# Patient Record
Sex: MALE | Race: BLACK OR AFRICAN AMERICAN | URBAN - METROPOLITAN AREA
[De-identification: names, ages, dates, MRNs, and addresses within clinical notes are randomized per-mention and may not be internally consistent; named-entity substitution may affect disease eponyms.]

---

## 2020-11-30 ENCOUNTER — RECORDS - HEALTHEAST (OUTPATIENT)
Dept: SCHEDULING | Facility: CLINIC | Age: 60
End: 2020-11-30

## 2020-11-30 ENCOUNTER — RECORDS - HEALTHEAST (OUTPATIENT)
Dept: ADMINISTRATIVE | Facility: OTHER | Age: 60
End: 2020-11-30

## 2020-11-30 DIAGNOSIS — R76.12 POSITIVE QUANTIFERON-TB GOLD TEST: ICD-10-CM

## 2025-07-17 ENCOUNTER — APPOINTMENT (OUTPATIENT)
Dept: CT IMAGING | Facility: HOSPITAL | Age: 65
End: 2025-07-17
Attending: STUDENT IN AN ORGANIZED HEALTH CARE EDUCATION/TRAINING PROGRAM
Payer: COMMERCIAL

## 2025-07-17 ENCOUNTER — HOSPITAL ENCOUNTER (INPATIENT)
Facility: HOSPITAL | Age: 65
End: 2025-07-17
Attending: STUDENT IN AN ORGANIZED HEALTH CARE EDUCATION/TRAINING PROGRAM | Admitting: INTERNAL MEDICINE
Payer: COMMERCIAL

## 2025-07-17 VITALS
RESPIRATION RATE: 18 BRPM | BODY MASS INDEX: 33.41 KG/M2 | HEART RATE: 78 BPM | TEMPERATURE: 98.9 F | HEIGHT: 69 IN | OXYGEN SATURATION: 96 % | DIASTOLIC BLOOD PRESSURE: 74 MMHG | SYSTOLIC BLOOD PRESSURE: 154 MMHG | WEIGHT: 225.6 LBS

## 2025-07-17 DIAGNOSIS — K92.1 BLACK STOOLS: ICD-10-CM

## 2025-07-17 DIAGNOSIS — K92.2 UPPER GI BLEED: ICD-10-CM

## 2025-07-17 LAB
ABO + RH BLD: NORMAL
ALBUMIN SERPL BCG-MCNC: 4.3 G/DL (ref 3.5–5.2)
ALP SERPL-CCNC: 99 U/L (ref 40–150)
ALT SERPL W P-5'-P-CCNC: 24 U/L (ref 0–70)
ANION GAP SERPL CALCULATED.3IONS-SCNC: 11 MMOL/L (ref 7–15)
AST SERPL W P-5'-P-CCNC: 21 U/L (ref 0–45)
BASOPHILS # BLD AUTO: 0 10E3/UL (ref 0–0.2)
BASOPHILS NFR BLD AUTO: 0 %
BILIRUB SERPL-MCNC: 0.5 MG/DL
BLD GP AB SCN SERPL QL: NEGATIVE
BUN SERPL-MCNC: 22.6 MG/DL (ref 8–23)
CALCIUM SERPL-MCNC: 8.9 MG/DL (ref 8.8–10.4)
CHLORIDE SERPL-SCNC: 110 MMOL/L (ref 98–107)
CREAT SERPL-MCNC: 0.84 MG/DL (ref 0.67–1.17)
EGFRCR SERPLBLD CKD-EPI 2021: >90 ML/MIN/1.73M2
EOSINOPHIL # BLD AUTO: 0.2 10E3/UL (ref 0–0.7)
EOSINOPHIL NFR BLD AUTO: 2 %
ERYTHROCYTE [DISTWIDTH] IN BLOOD BY AUTOMATED COUNT: 13.1 % (ref 10–15)
GLUCOSE SERPL-MCNC: 111 MG/DL (ref 70–99)
HCO3 SERPL-SCNC: 23 MMOL/L (ref 22–29)
HCT VFR BLD AUTO: 43.5 % (ref 40–53)
HGB BLD-MCNC: 14.4 G/DL (ref 13.3–17.7)
HOLD SPECIMEN: NORMAL
IMM GRANULOCYTES # BLD: 0 10E3/UL
IMM GRANULOCYTES NFR BLD: 0 %
INR PPP: 0.99 (ref 0.85–1.15)
LYMPHOCYTES # BLD AUTO: 3.5 10E3/UL (ref 0.8–5.3)
LYMPHOCYTES NFR BLD AUTO: 41 %
MCH RBC QN AUTO: 29.7 PG (ref 26.5–33)
MCHC RBC AUTO-ENTMCNC: 33.1 G/DL (ref 31.5–36.5)
MCV RBC AUTO: 90 FL (ref 78–100)
MONOCYTES # BLD AUTO: 0.5 10E3/UL (ref 0–1.3)
MONOCYTES NFR BLD AUTO: 6 %
NEUTROPHILS # BLD AUTO: 4.3 10E3/UL (ref 1.6–8.3)
NEUTROPHILS NFR BLD AUTO: 51 %
NRBC # BLD AUTO: 0 10E3/UL
NRBC BLD AUTO-RTO: 0 /100
PLATELET # BLD AUTO: 199 10E3/UL (ref 150–450)
POTASSIUM SERPL-SCNC: 4.2 MMOL/L (ref 3.4–5.3)
PROT SERPL-MCNC: 7.1 G/DL (ref 6.4–8.3)
PROTHROMBIN TIME: 13.4 SECONDS (ref 11.8–14.8)
RBC # BLD AUTO: 4.85 10E6/UL (ref 4.4–5.9)
SODIUM SERPL-SCNC: 144 MMOL/L (ref 135–145)
SPECIMEN EXP DATE BLD: NORMAL
WBC # BLD AUTO: 8.5 10E3/UL (ref 4–11)

## 2025-07-17 PROCEDURE — 96374 THER/PROPH/DIAG INJ IV PUSH: CPT | Mod: 59

## 2025-07-17 PROCEDURE — 250N000011 HC RX IP 250 OP 636: Performed by: STUDENT IN AN ORGANIZED HEALTH CARE EDUCATION/TRAINING PROGRAM

## 2025-07-17 PROCEDURE — 85610 PROTHROMBIN TIME: CPT | Performed by: STUDENT IN AN ORGANIZED HEALTH CARE EDUCATION/TRAINING PROGRAM

## 2025-07-17 PROCEDURE — 96361 HYDRATE IV INFUSION ADD-ON: CPT

## 2025-07-17 PROCEDURE — 258N000003 HC RX IP 258 OP 636: Performed by: STUDENT IN AN ORGANIZED HEALTH CARE EDUCATION/TRAINING PROGRAM

## 2025-07-17 PROCEDURE — 85014 HEMATOCRIT: CPT | Performed by: STUDENT IN AN ORGANIZED HEALTH CARE EDUCATION/TRAINING PROGRAM

## 2025-07-17 PROCEDURE — 74174 CTA ABD&PLVS W/CONTRAST: CPT

## 2025-07-17 PROCEDURE — 120N000001 HC R&B MED SURG/OB

## 2025-07-17 PROCEDURE — 99223 1ST HOSP IP/OBS HIGH 75: CPT | Performed by: INTERNAL MEDICINE

## 2025-07-17 PROCEDURE — 82310 ASSAY OF CALCIUM: CPT | Performed by: STUDENT IN AN ORGANIZED HEALTH CARE EDUCATION/TRAINING PROGRAM

## 2025-07-17 PROCEDURE — 86900 BLOOD TYPING SEROLOGIC ABO: CPT | Performed by: STUDENT IN AN ORGANIZED HEALTH CARE EDUCATION/TRAINING PROGRAM

## 2025-07-17 PROCEDURE — 36415 COLL VENOUS BLD VENIPUNCTURE: CPT | Performed by: STUDENT IN AN ORGANIZED HEALTH CARE EDUCATION/TRAINING PROGRAM

## 2025-07-17 PROCEDURE — 99285 EMERGENCY DEPT VISIT HI MDM: CPT | Mod: 25

## 2025-07-17 RX ORDER — DIAZEPAM 10 MG/2ML
5 INJECTION, SOLUTION INTRAMUSCULAR; INTRAVENOUS ONCE
Status: COMPLETED | OUTPATIENT
Start: 2025-07-18 | End: 2025-07-17

## 2025-07-17 RX ORDER — IOPAMIDOL 755 MG/ML
90 INJECTION, SOLUTION INTRAVASCULAR ONCE
Status: COMPLETED | OUTPATIENT
Start: 2025-07-17 | End: 2025-07-17

## 2025-07-17 RX ORDER — ESCITALOPRAM OXALATE 10 MG/1
10 TABLET ORAL ONCE
Status: DISCONTINUED | OUTPATIENT
Start: 2025-07-18 | End: 2025-07-17

## 2025-07-17 RX ADMIN — SODIUM CHLORIDE 1000 ML: 0.9 INJECTION, SOLUTION INTRAVENOUS at 21:23

## 2025-07-17 RX ADMIN — DIAZEPAM 5 MG: 5 INJECTION, SOLUTION INTRAMUSCULAR; INTRAVENOUS at 23:58

## 2025-07-17 RX ADMIN — IOPAMIDOL 90 ML: 755 INJECTION, SOLUTION INTRAVENOUS at 22:13

## 2025-07-17 RX ADMIN — PANTOPRAZOLE SODIUM 40 MG: 40 INJECTION, POWDER, FOR SOLUTION INTRAVENOUS at 21:22

## 2025-07-17 ASSESSMENT — COLUMBIA-SUICIDE SEVERITY RATING SCALE - C-SSRS
2. HAVE YOU ACTUALLY HAD ANY THOUGHTS OF KILLING YOURSELF IN THE PAST MONTH?: NO
6. HAVE YOU EVER DONE ANYTHING, STARTED TO DO ANYTHING, OR PREPARED TO DO ANYTHING TO END YOUR LIFE?: NO
1. IN THE PAST MONTH, HAVE YOU WISHED YOU WERE DEAD OR WISHED YOU COULD GO TO SLEEP AND NOT WAKE UP?: NO

## 2025-07-17 ASSESSMENT — ACTIVITIES OF DAILY LIVING (ADL)
ADLS_ACUITY_SCORE: 43
ADLS_ACUITY_SCORE: 41
ADLS_ACUITY_SCORE: 43

## 2025-07-17 NOTE — LETTER
[facility] EXTENDED RECOVERY AND SHORT STAY  [address] [address]  Phone: [phone]  Fax: [phone]    July 19, 2025        Angel Garcia  [address] [address]          To whom it may concern:    RE: Angel Garcia was hospitalized and under our care from 7/17/25-7/19/25 and may return to work on 7/23/25 without restrictions.    Mr. Garcia has a scheduled work trip on 7/28/25, and I have recommended that he make a decision based on his ability to attend this trip early to mid week based on how he is clinically feeling at that time.  Thank you very much for your understanding on this request.     Please contact me for questions or concerns.      Sincerely,      ElseBeau MD

## 2025-07-18 PROBLEM — K50.90 CROHN'S DISEASE (H): Status: ACTIVE | Noted: 2025-07-18

## 2025-07-18 PROBLEM — R76.12 POSITIVE QUANTIFERON-TB GOLD TEST: Status: ACTIVE | Noted: 2022-08-16

## 2025-07-18 PROBLEM — I10 ESSENTIAL HYPERTENSION: Status: ACTIVE | Noted: 2023-03-06

## 2025-07-18 PROBLEM — L40.9 PSORIASIS: Status: ACTIVE | Noted: 2021-09-03

## 2025-07-18 PROBLEM — Z92.89 HISTORY OF RECENT BLOOD TRANSFUSION: Status: ACTIVE | Noted: 2025-07-18

## 2025-07-18 PROBLEM — K92.2 UPPER GI BLEED: Status: ACTIVE | Noted: 2025-07-18

## 2025-07-18 PROBLEM — K31.7 BENIGN POLYP OF DUODENUM: Status: ACTIVE | Noted: 2025-07-18

## 2025-07-18 PROBLEM — R14.0 ABDOMINAL DISTENSION, GASEOUS: Status: ACTIVE | Noted: 2019-04-16

## 2025-07-18 PROBLEM — D62 ANEMIA DUE TO BLOOD LOSS, ACUTE: Status: ACTIVE | Noted: 2025-07-18

## 2025-07-18 PROBLEM — R14.0 ABDOMINAL BLOATING: Status: ACTIVE | Noted: 2022-08-16

## 2025-07-18 LAB
ALBUMIN SERPL BCG-MCNC: 3.8 G/DL (ref 3.5–5.2)
ALP SERPL-CCNC: 72 U/L (ref 40–150)
ALT SERPL W P-5'-P-CCNC: 21 U/L (ref 0–70)
ANION GAP SERPL CALCULATED.3IONS-SCNC: 7 MMOL/L (ref 7–15)
AST SERPL W P-5'-P-CCNC: 17 U/L (ref 0–45)
BASOPHILS # BLD AUTO: 0 10E3/UL (ref 0–0.2)
BASOPHILS NFR BLD AUTO: 0 %
BILIRUB SERPL-MCNC: 0.5 MG/DL
BUN SERPL-MCNC: 31.5 MG/DL (ref 8–23)
CALCIUM SERPL-MCNC: 8.5 MG/DL (ref 8.8–10.4)
CHLORIDE SERPL-SCNC: 112 MMOL/L (ref 98–107)
CREAT SERPL-MCNC: 0.81 MG/DL (ref 0.67–1.17)
EGFRCR SERPLBLD CKD-EPI 2021: >90 ML/MIN/1.73M2
EOSINOPHIL # BLD AUTO: 0.1 10E3/UL (ref 0–0.7)
EOSINOPHIL NFR BLD AUTO: 1 %
ERYTHROCYTE [DISTWIDTH] IN BLOOD BY AUTOMATED COUNT: 13.2 % (ref 10–15)
GLUCOSE BLDC GLUCOMTR-MCNC: 115 MG/DL (ref 70–99)
GLUCOSE BLDC GLUCOMTR-MCNC: 115 MG/DL (ref 70–99)
GLUCOSE SERPL-MCNC: 87 MG/DL (ref 70–99)
HCO3 SERPL-SCNC: 25 MMOL/L (ref 22–29)
HCT VFR BLD AUTO: 37.4 % (ref 40–53)
HGB BLD-MCNC: 12.4 G/DL (ref 13.3–17.7)
HGB BLD-MCNC: 12.9 G/DL (ref 13.3–17.7)
HGB BLD-MCNC: 13.3 G/DL (ref 13.3–17.7)
HGB BLD-MCNC: 13.3 G/DL (ref 13.3–17.7)
IMM GRANULOCYTES # BLD: 0 10E3/UL
IMM GRANULOCYTES NFR BLD: 1 %
LYMPHOCYTES # BLD AUTO: 2.3 10E3/UL (ref 0.8–5.3)
LYMPHOCYTES NFR BLD AUTO: 27 %
MCH RBC QN AUTO: 31.3 PG (ref 26.5–33)
MCHC RBC AUTO-ENTMCNC: 34.5 G/DL (ref 31.5–36.5)
MCV RBC AUTO: 91 FL (ref 78–100)
MCV RBC AUTO: 91 FL (ref 78–100)
MCV RBC AUTO: 92 FL (ref 78–100)
MCV RBC AUTO: 92 FL (ref 78–100)
MONOCYTES # BLD AUTO: 0.6 10E3/UL (ref 0–1.3)
MONOCYTES NFR BLD AUTO: 6 %
NEUTROPHILS # BLD AUTO: 5.6 10E3/UL (ref 1.6–8.3)
NEUTROPHILS NFR BLD AUTO: 66 %
NRBC # BLD AUTO: 0 10E3/UL
NRBC BLD AUTO-RTO: 0 /100
PLATELET # BLD AUTO: 183 10E3/UL (ref 150–450)
POTASSIUM SERPL-SCNC: 4.8 MMOL/L (ref 3.4–5.3)
PROT SERPL-MCNC: 6.2 G/DL (ref 6.4–8.3)
RBC # BLD AUTO: 4.12 10E6/UL (ref 4.4–5.9)
SODIUM SERPL-SCNC: 144 MMOL/L (ref 135–145)
UPPER GI ENDOSCOPY: NORMAL
WBC # BLD AUTO: 8.5 10E3/UL (ref 4–11)

## 2025-07-18 PROCEDURE — 370N000017 HC ANESTHESIA TECHNICAL FEE, PER MIN: Performed by: INTERNAL MEDICINE

## 2025-07-18 PROCEDURE — 250N000011 HC RX IP 250 OP 636: Performed by: INTERNAL MEDICINE

## 2025-07-18 PROCEDURE — 258N000003 HC RX IP 258 OP 636: Performed by: INTERNAL MEDICINE

## 2025-07-18 PROCEDURE — 250N000013 HC RX MED GY IP 250 OP 250 PS 637: Performed by: HOSPITALIST

## 2025-07-18 PROCEDURE — 0W3P8ZZ CONTROL BLEEDING IN GASTROINTESTINAL TRACT, VIA NATURAL OR ARTIFICIAL OPENING ENDOSCOPIC: ICD-10-PCS | Performed by: INTERNAL MEDICINE

## 2025-07-18 PROCEDURE — 272N000001 HC OR GENERAL SUPPLY STERILE: Performed by: INTERNAL MEDICINE

## 2025-07-18 PROCEDURE — 120N000001 HC R&B MED SURG/OB

## 2025-07-18 PROCEDURE — 85025 COMPLETE CBC W/AUTO DIFF WBC: CPT | Performed by: INTERNAL MEDICINE

## 2025-07-18 PROCEDURE — 360N000075 HC SURGERY LEVEL 2, PER MIN: Performed by: INTERNAL MEDICINE

## 2025-07-18 PROCEDURE — 710N000012 HC RECOVERY PHASE 2, PER MINUTE: Performed by: INTERNAL MEDICINE

## 2025-07-18 PROCEDURE — 258N000003 HC RX IP 258 OP 636: Performed by: ANESTHESIOLOGY

## 2025-07-18 PROCEDURE — 999N000141 HC STATISTIC PRE-PROCEDURE NURSING ASSESSMENT: Performed by: INTERNAL MEDICINE

## 2025-07-18 PROCEDURE — 36415 COLL VENOUS BLD VENIPUNCTURE: CPT | Performed by: INTERNAL MEDICINE

## 2025-07-18 PROCEDURE — 80053 COMPREHEN METABOLIC PANEL: CPT | Performed by: INTERNAL MEDICINE

## 2025-07-18 PROCEDURE — 250N000013 HC RX MED GY IP 250 OP 250 PS 637: Performed by: STUDENT IN AN ORGANIZED HEALTH CARE EDUCATION/TRAINING PROGRAM

## 2025-07-18 PROCEDURE — 85004 AUTOMATED DIFF WBC COUNT: CPT | Performed by: INTERNAL MEDICINE

## 2025-07-18 PROCEDURE — 99233 SBSQ HOSP IP/OBS HIGH 50: CPT | Performed by: HOSPITALIST

## 2025-07-18 DEVICE — CLIP HEMOSTATIC ASSURANCE W11 MM 00711882: Type: IMPLANTABLE DEVICE | Site: DUODENUM | Status: FUNCTIONAL

## 2025-07-18 DEVICE — CLIP HEMOSTASIS ASSURANCE W16 MM BX00711884: Type: IMPLANTABLE DEVICE | Site: DUODENUM | Status: FUNCTIONAL

## 2025-07-18 RX ORDER — USTEKINUMAB 90 MG/ML
1 INJECTION, SOLUTION SUBCUTANEOUS
COMMUNITY
Start: 2025-06-26

## 2025-07-18 RX ORDER — HYDROMORPHONE HCL IN WATER/PF 6 MG/30 ML
0.2 PATIENT CONTROLLED ANALGESIA SYRINGE INTRAVENOUS EVERY 5 MIN PRN
Status: DISCONTINUED | OUTPATIENT
Start: 2025-07-18 | End: 2025-07-18 | Stop reason: HOSPADM

## 2025-07-18 RX ORDER — CETIRIZINE HYDROCHLORIDE 10 MG/1
10 TABLET ORAL EVERY MORNING
COMMUNITY

## 2025-07-18 RX ORDER — LIDOCAINE 40 MG/G
CREAM TOPICAL
Status: DISCONTINUED | OUTPATIENT
Start: 2025-07-18 | End: 2025-07-18 | Stop reason: HOSPADM

## 2025-07-18 RX ORDER — SODIUM CHLORIDE 9 MG/ML
INJECTION, SOLUTION INTRAVENOUS CONTINUOUS
Status: DISCONTINUED | OUTPATIENT
Start: 2025-07-18 | End: 2025-07-18

## 2025-07-18 RX ORDER — SODIUM CHLORIDE, SODIUM LACTATE, POTASSIUM CHLORIDE, CALCIUM CHLORIDE 600; 310; 30; 20 MG/100ML; MG/100ML; MG/100ML; MG/100ML
INJECTION, SOLUTION INTRAVENOUS CONTINUOUS
Status: DISCONTINUED | OUTPATIENT
Start: 2025-07-18 | End: 2025-07-18 | Stop reason: HOSPADM

## 2025-07-18 RX ORDER — NALOXONE HYDROCHLORIDE 0.4 MG/ML
0.1 INJECTION, SOLUTION INTRAMUSCULAR; INTRAVENOUS; SUBCUTANEOUS
Status: DISCONTINUED | OUTPATIENT
Start: 2025-07-18 | End: 2025-07-18 | Stop reason: HOSPADM

## 2025-07-18 RX ORDER — FENTANYL CITRATE 50 UG/ML
50 INJECTION, SOLUTION INTRAMUSCULAR; INTRAVENOUS EVERY 5 MIN PRN
Status: DISCONTINUED | OUTPATIENT
Start: 2025-07-18 | End: 2025-07-18 | Stop reason: HOSPADM

## 2025-07-18 RX ORDER — HYDROMORPHONE HCL IN WATER/PF 6 MG/30 ML
0.4 PATIENT CONTROLLED ANALGESIA SYRINGE INTRAVENOUS EVERY 5 MIN PRN
Status: DISCONTINUED | OUTPATIENT
Start: 2025-07-18 | End: 2025-07-18 | Stop reason: HOSPADM

## 2025-07-18 RX ORDER — MULTIVITAMIN WITH IRON
1 TABLET ORAL DAILY
COMMUNITY

## 2025-07-18 RX ORDER — DEXTROSE MONOHYDRATE 25 G/50ML
25-50 INJECTION, SOLUTION INTRAVENOUS
Status: DISCONTINUED | OUTPATIENT
Start: 2025-07-18 | End: 2025-07-19 | Stop reason: HOSPADM

## 2025-07-18 RX ORDER — ONDANSETRON 2 MG/ML
4 INJECTION INTRAMUSCULAR; INTRAVENOUS EVERY 30 MIN PRN
Status: DISCONTINUED | OUTPATIENT
Start: 2025-07-18 | End: 2025-07-18 | Stop reason: HOSPADM

## 2025-07-18 RX ORDER — ACETAMINOPHEN AND CODEINE PHOSPHATE 300; 30 MG/1; MG/1
1 TABLET ORAL EVERY 4 HOURS PRN
COMMUNITY
Start: 2025-07-13

## 2025-07-18 RX ORDER — DIAZEPAM 10 MG/2ML
2.5 INJECTION, SOLUTION INTRAMUSCULAR; INTRAVENOUS ONCE
Status: COMPLETED | OUTPATIENT
Start: 2025-07-18 | End: 2025-07-18

## 2025-07-18 RX ORDER — LEVALBUTEROL TARTRATE 45 UG/1
1-2 AEROSOL, METERED ORAL EVERY 4 HOURS PRN
COMMUNITY
Start: 2025-07-02

## 2025-07-18 RX ORDER — ONDANSETRON 4 MG/1
4 TABLET, ORALLY DISINTEGRATING ORAL EVERY 30 MIN PRN
Status: DISCONTINUED | OUTPATIENT
Start: 2025-07-18 | End: 2025-07-18 | Stop reason: HOSPADM

## 2025-07-18 RX ORDER — EPINEPHRINE 0.1 MG/ML
INJECTION INTRAVENOUS PRN
Status: DISCONTINUED | OUTPATIENT
Start: 2025-07-18 | End: 2025-07-18 | Stop reason: HOSPADM

## 2025-07-18 RX ORDER — ALPRAZOLAM 0.5 MG
0.5 TABLET ORAL 2 TIMES DAILY PRN
COMMUNITY
Start: 2024-07-22

## 2025-07-18 RX ORDER — DEXAMETHASONE SODIUM PHOSPHATE 10 MG/ML
4 INJECTION, SOLUTION INTRAMUSCULAR; INTRAVENOUS
Status: DISCONTINUED | OUTPATIENT
Start: 2025-07-18 | End: 2025-07-18 | Stop reason: HOSPADM

## 2025-07-18 RX ORDER — ALUMINUM ZIRCONIUM OCTACHLOROHYDREX GLY 16 G/100G
2 GEL TOPICAL EVERY 24 HOURS
COMMUNITY
Start: 2025-05-29

## 2025-07-18 RX ORDER — FAMOTIDINE 10 MG
10 TABLET ORAL 2 TIMES DAILY PRN
Status: DISCONTINUED | OUTPATIENT
Start: 2025-07-18 | End: 2025-07-19 | Stop reason: HOSPADM

## 2025-07-18 RX ORDER — OMEPRAZOLE 20 MG/1
20 CAPSULE, DELAYED RELEASE ORAL 2 TIMES DAILY
COMMUNITY
Start: 2025-05-07

## 2025-07-18 RX ORDER — USTEKINUMAB 90 MG/ML
90 INJECTION, SOLUTION SUBCUTANEOUS
COMMUNITY
Start: 2024-11-05

## 2025-07-18 RX ORDER — ACETAMINOPHEN 325 MG/1
650 TABLET ORAL EVERY 6 HOURS PRN
Status: DISCONTINUED | OUTPATIENT
Start: 2025-07-18 | End: 2025-07-19 | Stop reason: HOSPADM

## 2025-07-18 RX ORDER — NICOTINE POLACRILEX 4 MG
15-30 LOZENGE BUCCAL
Status: DISCONTINUED | OUTPATIENT
Start: 2025-07-18 | End: 2025-07-19 | Stop reason: HOSPADM

## 2025-07-18 RX ORDER — ATENOLOL 25 MG/1
25 TABLET ORAL 2 TIMES DAILY
COMMUNITY
Start: 2024-02-29

## 2025-07-18 RX ORDER — FAMOTIDINE 20 MG/1
20 TABLET, FILM COATED ORAL 2 TIMES DAILY
COMMUNITY
Start: 2025-03-05

## 2025-07-18 RX ORDER — CETIRIZINE HYDROCHLORIDE 10 MG/1
2 TABLET ORAL EVERY EVENING
COMMUNITY
Start: 2025-07-02

## 2025-07-18 RX ORDER — OXYCODONE HYDROCHLORIDE 5 MG/1
10 TABLET ORAL
Status: DISCONTINUED | OUTPATIENT
Start: 2025-07-18 | End: 2025-07-18

## 2025-07-18 RX ORDER — LIDOCAINE 40 MG/G
CREAM TOPICAL
Status: DISCONTINUED | OUTPATIENT
Start: 2025-07-18 | End: 2025-07-19 | Stop reason: HOSPADM

## 2025-07-18 RX ORDER — CALCIUM CARBONATE 500 MG/1
1000 TABLET, CHEWABLE ORAL 4 TIMES DAILY PRN
Status: DISCONTINUED | OUTPATIENT
Start: 2025-07-18 | End: 2025-07-19 | Stop reason: HOSPADM

## 2025-07-18 RX ORDER — ESCITALOPRAM OXALATE 10 MG/1
10 TABLET ORAL DAILY
COMMUNITY
Start: 2024-07-22

## 2025-07-18 RX ORDER — FENTANYL CITRATE 50 UG/ML
25 INJECTION, SOLUTION INTRAMUSCULAR; INTRAVENOUS EVERY 5 MIN PRN
Status: DISCONTINUED | OUTPATIENT
Start: 2025-07-18 | End: 2025-07-18 | Stop reason: HOSPADM

## 2025-07-18 RX ORDER — OXYCODONE HYDROCHLORIDE 5 MG/1
5 TABLET ORAL
Status: DISCONTINUED | OUTPATIENT
Start: 2025-07-18 | End: 2025-07-18

## 2025-07-18 RX ADMIN — SODIUM CHLORIDE, SODIUM LACTATE, POTASSIUM CHLORIDE, AND CALCIUM CHLORIDE: .6; .31; .03; .02 INJECTION, SOLUTION INTRAVENOUS at 11:13

## 2025-07-18 RX ADMIN — SODIUM CHLORIDE: 0.9 INJECTION, SOLUTION INTRAVENOUS at 01:58

## 2025-07-18 RX ADMIN — PANTOPRAZOLE SODIUM 40 MG: 40 INJECTION, POWDER, FOR SOLUTION INTRAVENOUS at 19:44

## 2025-07-18 RX ADMIN — DIAZEPAM 2.5 MG: 10 INJECTION, SOLUTION INTRAMUSCULAR; INTRAVENOUS at 01:59

## 2025-07-18 RX ADMIN — ACETAMINOPHEN 650 MG: 325 TABLET ORAL at 19:44

## 2025-07-18 RX ADMIN — FAMOTIDINE 10 MG: 10 TABLET ORAL at 14:20

## 2025-07-18 RX ADMIN — PANTOPRAZOLE SODIUM 40 MG: 40 INJECTION, POWDER, FOR SOLUTION INTRAVENOUS at 10:02

## 2025-07-18 ASSESSMENT — ACTIVITIES OF DAILY LIVING (ADL)
ADLS_ACUITY_SCORE: 36
ADLS_ACUITY_SCORE: 43
ADLS_ACUITY_SCORE: 56
ADLS_ACUITY_SCORE: 56
ADLS_ACUITY_SCORE: 43
ADLS_ACUITY_SCORE: 43
ADLS_ACUITY_SCORE: 56
ADLS_ACUITY_SCORE: 56
ADLS_ACUITY_SCORE: 43
ADLS_ACUITY_SCORE: 56
ADLS_ACUITY_SCORE: 43
ADLS_ACUITY_SCORE: 56
ADLS_ACUITY_SCORE: 56
ADLS_ACUITY_SCORE: 36
ADLS_ACUITY_SCORE: 43
ADLS_ACUITY_SCORE: 36
ADLS_ACUITY_SCORE: 43
ADLS_ACUITY_SCORE: 33
ADLS_ACUITY_SCORE: 56
ADLS_ACUITY_SCORE: 56
ADLS_ACUITY_SCORE: 43
ADLS_ACUITY_SCORE: 43
ADLS_ACUITY_SCORE: 56

## 2025-07-18 NOTE — H&P
[facility] Digestive Health consult         Name: Angel Garcia    Medical Record #: [id number]    YOB: 1960    Date/Time: 7/18/2025/8:50 AM    Reason for Consultation: Kiki De La Rosa MD has asked me to evaluate Angel Garcia regarding GI bleeding.    HPI: 65 years old with history of Crohn's disease eosinophilic esophagitis, previous peptic ulcer disease chronic back pain, depression on SSRI GERD, latent TB.  He presents with black tarry stool.  He started having melena yesterday, and then had reddish blood and black stool overnight.  Felt dizzy on walking today.  No loss of consciousness.  No NSAIDs no anticoagulants.  The patient underwent EGD on 7/11/2025, single duodenal polyp was resected from the second portion of duodenum this was treated with Hemoclip.  Pathology shows tubulovillous adenoma.    CTA showing active extravasation from the transverse portion of the duodenum.  Crohn's ileocolitis diagnosed in 2004 and has been on Stelara doses every 6 weekly.  Review of Systems (ROS): Complete ROS otherwise negative except for as above.    Past Medical History:  Crohn's disease eosinophilic esophagitis, chronic back pain, depression.  Medications:   (Not in a hospital admission)      Current Facility-Administered Medications:     calcium carbonate (TUMS) chewable tablet 1,000 mg, 1,000 mg, Oral, 4x Daily PRN, Maria Del Carmen Mendez MD    glucose gel 15-30 g, 15-30 g, Oral, Q15 Min PRN **OR** dextrose 50 % injection 25-50 mL, 25-50 mL, Intravenous, Q15 Min PRN **OR** glucagon injection 1 mg, 1 mg, Subcutaneous, Q15 Min PRN, Maria Del Carmen Mendez MD    lidocaine (LMX4) cream, , Topical, Q1H PRN, Maria Del Carmen Mendez MD    lidocaine 1 % 0.1-1 mL, 0.1-1 mL, Other, Q1H PRN, Maria Del Carmen Mendez MD    pantoprazole (PROTONIX) injection 40 mg, 40 mg, Intravenous, BID, Maria Del Carmen Mendez MD    sodium chloride (PF) 0.9% PF flush 3 mL, 3 mL, Intracatheter, Q8H JASEN, Maria Del Carmen Mendez MD    sodium chloride (PF) 0.9% PF flush 3 mL, 3 mL,  Intracatheter, q1 min prn, Maria Del Carmen Mendez MD    sodium chloride 0.9 % infusion, , Intravenous, Continuous, Maria Del Carmen Mendez MD, Last Rate: 50 mL/hr at 07/18/25 0406, Rate Verify at 07/18/25 0406    Current Outpatient Medications:     acetaminophen-codeine (TYLENOL #3) 300-30 MG per tablet, Take 1 tablet by mouth every 4 hours as needed for pain., Disp: , Rfl:     ALPRAZolam (XANAX) 0.5 MG tablet, Take 0.5 mg by mouth 2 times daily as needed for anxiety., Disp: , Rfl:     atenolol (TENORMIN) 25 MG tablet, Take 25 mg by mouth 2 times daily., Disp: , Rfl:     cetirizine (ZYRTEC) 10 MG tablet, Take 2 tablets by mouth every evening., Disp: , Rfl:     cetirizine (ZYRTEC) 10 MG tablet, Take 10 mg by mouth every morning., Disp: , Rfl:     escitalopram (LEXAPRO) 10 MG tablet, Take 10 mg by mouth daily., Disp: , Rfl:     famotidine (PEPCID) 20 MG tablet, Take 20 mg by mouth 2 times daily., Disp: , Rfl:     levalbuterol (XOPENEX HFA) 45 MCG/ACT inhaler, Inhale 1-2 puffs into the lungs every 4 hours as needed for wheezing., Disp: , Rfl:     magnesium (RA NATURAL MAGNESIUM) 250 MG tablet, Take 1 tablet by mouth daily. magnesium (RA NATURAL MAGNESIUM) 250 MG tablet, Disp: , Rfl:     omeprazole (PRILOSEC) 20 MG DR capsule, Take 20 mg by mouth 2 times daily., Disp: , Rfl:     psyllium (METAMUCIL SMOOTH TEXTURE) 58.6 % powder, Take 2 Tablespoonful by mouth every 24 hours., Disp: , Rfl:     STELARA 90 MG/ML injection, Inject 90 mg subcutaneously once every six weeks., Disp: , Rfl:     ustekinumab-kfce (YESINTEK) 90 MG/ML injection, Inject 1 mL subcutaneously once every six weeks., Disp: , Rfl:        Allergies: Sulfa antibiotics, Ace inhibitors, Trimethoprim, Adhesive tape, Ceftriaxone, and Oxycodone    Family History:  No family history on file.    Social History:  Social History     Socioeconomic History    Marital status:      Spouse name: Not on file    Number of children: Not on file    Years of education: Not on file     "Highest education level: Not on file   Occupational History    Not on file   Tobacco Use    Smoking status: Not on file    Smokeless tobacco: Not on file   Substance and Sexual Activity    Alcohol use: Not on file    Drug use: Not on file    Sexual activity: Not on file   Other Topics Concern    Not on file   Social History Narrative    Not on file     Social Drivers of Health     Financial Resource Strain: Low Risk  (3/5/2025)    [facility]    Financial Resource Strain     Difficulty of Paying Living Expenses: 3     Difficulty of Paying Living Expenses: Not on file   Food Insecurity: No Food Insecurity (3/5/2025)    [facility]    Food Insecurity     Do you worry your food will run out before you are able to buy more?: 1   Transportation Needs: No Transportation Needs (3/5/2025)    [facility]    Transportation Needs     Does lack of transportation keep you from medical appointments?: 1     Does lack of transportation keep you from work, meetings or getting things that you need?: 1   Physical Activity: Not on file   Stress: Not on file   Social Connections: Socially Integrated (3/5/2025)    Received from [facility]    Social Connections     Do you often feel lonely or isolated from those around you?: 0   Interpersonal Safety: Not on file   Housing Stability: Low Risk  (3/5/2025)    Received from [facility]    Housing Stability     What is your housing situation today?: 1       Physical Exam: /74 (BP Location: Right arm)   Pulse 94   Temp 98.7  F (37.1  C) (Oral)   Resp 19   Ht 1.74 m (5' 8.5\")   Wt 102.3 kg (225 lb 9.6 oz)   SpO2 98%   BMI 33.80 kg/m      General: No acute distress  Eyes: No scleral icterus or conjunctivitis  Oropharynx: Moist, no ulcers or lesions  Neck/Thyroid: No neck masses " or thyromegaly  Pulmonary: Lungs are clear to auscultation bilaterally  Cardiovascular: Regular, rate, rhythm   Gastrointestinal: Soft, non-tedner, soft, non-tender, no rebound or guarding. No masses palpable.Positive bowel sounds,  Skin: The patient is not jaundiced. No obvious rashes  Extremities: No pre-tibial edema, no clubbing.   Neurologic: Alert and oriented ×3 , non- focal, grossly intact.    Labs:    CBC RESULTS:   Recent Labs   Lab Test 07/18/25  0733   WBC 8.5   RBC 4.12*   HGB 12.9*   HCT 37.4*   MCV 91   MCH 31.3   MCHC 34.5   RDW 13.2           CMP Results:   Recent Labs   Lab Test 07/18/25  0733      POTASSIUM 4.8   CHLORIDE 112*   CO2 25   ANIONGAP 7   GLC 87   BUN 31.5*   CR 0.81   BILITOTAL 0.5   ALKPHOS 72   ALT 21   AST 17        INR Results:   Recent Labs   Lab Test 07/17/25 2058   INR 0.99          Radiology: CTA GI Bleed  Result Date: 7/17/2025  EXAM: CTA GI BLEED LOCATION: [facility] DATE: 7/17/2025 INDICATION: Diarrhea and black stools. Endoscopic duodenal polyp resection 7/11/2025. COMPARISON: 3/7/2023 TECHNIQUE: CT angiogram abdomen pelvis during arterial phase of injection of IV contrast. 2D and 3D MIP reconstructions were performed by the CT technologist. Dose reduction techniques were used. CONTRAST: Isovue 370 90ML FINDINGS: ANGIOGRAM ABDOMEN/PELVIS: Normal caliber aorta, no dissection. Splanchnic, renal and iliac arteries are patent. On the arterial phase images there is a linear hyperdensity within transverse duodenum (series 6 images 155-170; coronal series 8 images 61-63). This linear hyperdensity is not seen on the precontrast nor on the two-minute delayed series. On the delayed images there is very faint intraluminal hyperdensity within proximal jejunum (best appreciated series 9 images 160-164 using narrow liver windows). The linear hyperdensity is favored to relate to a slow or transient GI bleed. The site of bleeding is located  immediately downstream from a large endovascular surgical clip present at junction of descending and transverse duodenal segments. LOWER CHEST: Normal. HEPATOBILIARY: Fatty infiltration of liver. PANCREAS: Normal. SPLEEN: Normal. ADRENAL GLANDS: Normal. KIDNEYS/BLADDER: Benign renal cysts need no dedicated follow-up. BOWEL: No obstruction or inflammatory change. LYMPH NODES: Normal. PELVIC ORGANS: Normal. MUSCULOSKELETAL: Normal.     IMPRESSION: 1.  Exam is positive for active extravasation involving the transverse portion of duodenum immediately downstream from an endoscopic surgical clip at site of recent duodenal polyp resection. Findings discussed with Dr. Leyva at 2305 hours       Impression: Patient with melena, recent duodenal polyp resection on 7/11/2025, presents with GI bleeding.,  Likely post polypectomy bleeding.  Crohn's disease on Stelara every 6 weekly.  Eosinophilic esophagitis.    Recommendation:   IV PPI.  Follow hemoglobin.  EGD today.  Total time spent was 30 minutes .                                              Tyler Mitchell M.D.  Thank you for the opportunity to participate in the care of this patient.   Please feel free to call me with any questions or concerns.  Phone number [phone].

## 2025-07-18 NOTE — PLAN OF CARE
Problem: Adult Inpatient Plan of Care  Goal: Optimal Comfort and Wellbeing  Outcome: Progressing   Goal Outcome Evaluation:         A/Ox4. Denies pain. Independent. Ativan given for anxiety. IVF infusing. NPO.

## 2025-07-18 NOTE — PROGRESS NOTES
[facility]    Medicine Progress Note - Hospitalist Service    Date of Admission:  7/17/2025    Assessment & Plan                Angel Garcia is a 65 year old male with Crohn's disease, eosinophilic esophagitis, FLORY, HTN, chronic back pain, recent duodenal polyp resection on 7/11 presented for evaluation of melena. Hospital Day: 2    #Melena  #Recent duodenal tubulovillous adenoma resection  -patient presented with fatigue and melena, Hgb 14.4-->12.9  -CTA showed active extravasation from site of duodenal polyp resection  -last BM 10pm last night, melenic. Does feel urge to have BM now though. Notes ongoing vasovagal symptoms at times.  -GI consultation  -Serial Hgb q4h  -IV PPI  -tele monitoring  -transfuse for Hgb <8 given active bleeding or if vitals become unstable  -blood consent signed and in the chart    #Crohn's  -hold home Stelara, Yesintek    #Dermatographia  -continue home Zyrtec. He takes 3x the recommended max dose per his dermatologist. While here, he is ok with the standard dose.    #FLORY, home Lexapro, Xanax  #HTN, home atenolol  #Reactive airway, home Xopenex PRN  #EoE, denies active issue. Recent EGD did not comment on any EoE changes.  #Chronic pain syndrome, uses tylenol 3 #45 tabs per month. Denies pain currently.            Diet: NPO for Medical/Clinical Reasons Except for: No Exceptions, Ice Chips    DVT Prophylaxis: Moderate risk. Pharmacologic prophylaxis contraindicated due to active bleeding   Gordon Catheter: Not present  Lines: None     Cardiac Monitoring: ACTIVE order. Indication: Tachyarrhythmias, acute (48 hours)  Code Status: Full Code      Clinically Significant Risk Factors Present on Admission          # Hyperchloremia: Highest Cl = 112 mmol/L in last 2 days, will monitor as appropriate      # Hypocalcemia: Lowest Ca = 8.5 mg/dL in last 2 days, will monitor and replace as appropriate         # Hypertension: Noted on problem list           # Obesity:  "Estimated body mass index is 33.8 kg/m  as calculated from the following:    Height as of this encounter: 1.74 m (5' 8.5\").    Weight as of this encounter: 102.3 kg (225 lb 9.6 oz).              Disposition Plan     Medically Ready for Discharge: Anticipated Tomorrow         Discharge barrier(s): active bleeding, EGD, IV PPI  Care discussed with: patient      Kiki De La Rosa MD  Hospitalist Service  [facility]  Securely message with [employer] (more info)  Text page via [employer] Paging/Directory   ______________________________________________________________________      Physical Exam   Vital Signs: Temp: 98.7  F (37.1  C) Temp src: Oral BP: 133/74 Pulse: 94   Resp: 19 SpO2: 98 % O2 Device: None (Room air)    Weight: 225 lbs 9.6 oz    General: in no apparent distress, non-toxic, and alert male lying in hospital bed oriented x3  HEENT: Head normocephalic atraumatic, oral mucosa moist. Sclerae anicteric  CV: Regular rhythm, normal rate, no murmurs  Resp: No wheezes, no rales or rhonchi, no focal consolidations  GI: Belly soft, nondistended, nontender, bowel sounds present  Skin: No rashes or lesions  Extremities: No peripheral edema  Psych: Normal affect, mildly anxious mood  Neuro: Grossly normal      Medical Decision Making               Data   Recent Results (from the past 16 hours)   INR    Collection Time: 07/17/25  8:58 PM   Result Value Ref Range    INR 0.99 0.85 - 1.15    PT 13.4 11.8 - 14.8 Seconds   Comprehensive metabolic panel    Collection Time: 07/17/25  8:58 PM   Result Value Ref Range    Sodium 144 135 - 145 mmol/L    Potassium 4.2 3.4 - 5.3 mmol/L    Carbon Dioxide (CO2) 23 22 - 29 mmol/L    Anion Gap 11 7 - 15 mmol/L    Urea Nitrogen 22.6 8.0 - 23.0 mg/dL    Creatinine 0.84 0.67 - 1.17 mg/dL    GFR Estimate >90 >60 mL/min/1.73m2    Calcium 8.9 8.8 - 10.4 mg/dL    Chloride 110 (H) 98 - 107 mmol/L    Glucose 111 (H) 70 - 99 mg/dL    Alkaline Phosphatase 99 40 - 150 U/L    AST 21 0 - 45 " U/L    ALT 24 0 - 70 U/L    Protein Total 7.1 6.4 - 8.3 g/dL    Albumin 4.3 3.5 - 5.2 g/dL    Bilirubin Total 0.5 <=1.2 mg/dL   Adult Type and Screen    Collection Time: 07/17/25  8:58 PM   Result Value Ref Range    ABO/RH(D) O POS     Antibody Screen Negative Negative    SPECIMEN EXPIRATION DATE 7/20/2025 11:59:00 PM CDT    CBC with platelets and differential    Collection Time: 07/17/25  8:58 PM   Result Value Ref Range    WBC Count 8.5 4.0 - 11.0 10e3/uL    RBC Count 4.85 4.40 - 5.90 10e6/uL    Hemoglobin 14.4 13.3 - 17.7 g/dL    Hematocrit 43.5 40.0 - 53.0 %    MCV 90 78 - 100 fL    MCH 29.7 26.5 - 33.0 pg    MCHC 33.1 31.5 - 36.5 g/dL    RDW 13.1 10.0 - 15.0 %    Platelet Count 199 150 - 450 10e3/uL    % Neutrophils 51 %    % Lymphocytes 41 %    % Monocytes 6 %    % Eosinophils 2 %    % Basophils 0 %    % Immature Granulocytes 0 %    NRBCs per 100 WBC 0 <1 /100    Absolute Neutrophils 4.3 1.6 - 8.3 10e3/uL    Absolute Lymphocytes 3.5 0.8 - 5.3 10e3/uL    Absolute Monocytes 0.5 0.0 - 1.3 10e3/uL    Absolute Eosinophils 0.2 0.0 - 0.7 10e3/uL    Absolute Basophils 0.0 0.0 - 0.2 10e3/uL    Absolute Immature Granulocytes 0.0 <=0.4 10e3/uL    Absolute NRBCs 0.0 10e3/uL   Extra Red Top Tube    Collection Time: 07/17/25  8:58 PM   Result Value Ref Range    Hold Specimen JI    Hemoglobin    Collection Time: 07/18/25  1:42 AM   Result Value Ref Range    Hemoglobin 13.3 13.3 - 17.7 g/dL    MCV 91 78 - 100 fL   Comprehensive metabolic panel    Collection Time: 07/18/25  7:33 AM   Result Value Ref Range    Sodium 144 135 - 145 mmol/L    Potassium 4.8 3.4 - 5.3 mmol/L    Carbon Dioxide (CO2) 25 22 - 29 mmol/L    Anion Gap 7 7 - 15 mmol/L    Urea Nitrogen 31.5 (H) 8.0 - 23.0 mg/dL    Creatinine 0.81 0.67 - 1.17 mg/dL    GFR Estimate >90 >60 mL/min/1.73m2    Calcium 8.5 (L) 8.8 - 10.4 mg/dL    Chloride 112 (H) 98 - 107 mmol/L    Glucose 87 70 - 99 mg/dL    Alkaline Phosphatase 72 40 - 150 U/L    AST 17 0 - 45 U/L    ALT 21 0  - 70 U/L    Protein Total 6.2 (L) 6.4 - 8.3 g/dL    Albumin 3.8 3.5 - 5.2 g/dL    Bilirubin Total 0.5 <=1.2 mg/dL   CBC with platelets and differential    Collection Time: 07/18/25  7:33 AM   Result Value Ref Range    WBC Count 8.5 4.0 - 11.0 10e3/uL    RBC Count 4.12 (L) 4.40 - 5.90 10e6/uL    Hemoglobin 12.9 (L) 13.3 - 17.7 g/dL    Hematocrit 37.4 (L) 40.0 - 53.0 %    MCV 91 78 - 100 fL    MCH 31.3 26.5 - 33.0 pg    MCHC 34.5 31.5 - 36.5 g/dL    RDW 13.2 10.0 - 15.0 %    Platelet Count 183 150 - 450 10e3/uL    % Neutrophils 66 %    % Lymphocytes 27 %    % Monocytes 6 %    % Eosinophils 1 %    % Basophils 0 %    % Immature Granulocytes 1 %    NRBCs per 100 WBC 0 <1 /100    Absolute Neutrophils 5.6 1.6 - 8.3 10e3/uL    Absolute Lymphocytes 2.3 0.8 - 5.3 10e3/uL    Absolute Monocytes 0.6 0.0 - 1.3 10e3/uL    Absolute Eosinophils 0.1 0.0 - 0.7 10e3/uL    Absolute Basophils 0.0 0.0 - 0.2 10e3/uL    Absolute Immature Granulocytes 0.0 <=0.4 10e3/uL    Absolute NRBCs 0.0 10e3/uL       Interval History   Reports doing ok. Denies pain. Last BM 10pm, black. Does have current urge for BM though. When up to urinate, noted diaphoresis.

## 2025-07-18 NOTE — H&P
"[facility]    History and Physical - Hospitalist Service       Date of Admission:  7/17/2025    Assessment & Plan   Angel Garcia is a 65 year old male with a medical history significant for a history of Crohn's disease, eosinophilic esophagitis, prior history of peptic ulcer, chronic back pain, major depression on SSRIs, GERD, latent TB and multiple medical comorbidities including a prior history of GI bleed requiring blood transfusion who is admitted with history of black stools as a possible complication of a recent endoscopy with polypectomy.               Diet: NPO for Medical/Clinical Reasons Except for: No Exceptions    DVT Prophylaxis: {DVT PROPHYLAXIS:015804}  Gordon Catheter: Not present  Lines: None     Cardiac Monitoring: None  Code Status:  ***    Clinically Significant Risk Factors Present on Admission   { TIP  This section helps capture the illness of the patient on admission.     - Review diagnoses highlighted in blue; right click, edit & delete if not appropriate   - If blank, no additional diagnoses identified   :85984}       # Hyperchloremia: Highest Cl = 110 mmol/L in last 2 days, will monitor as appropriate                        # Obesity: Estimated body mass index is 33.8 kg/m  as calculated from the following:    Height as of this encounter: 1.74 m (5' 8.5\").    Weight as of this encounter: 102.3 kg (225 lb 9.6 oz).              Disposition Plan   {TIP  It is required to update Medically Ready for Discharge [MRD] daily until the patient is 'Ready Now' (meets clinical goals). MRD reflects medical readiness -- not SANCHEZ (Expected Discharge Date), which may be delayed by disposition. Last MRD-   . Use the SmartList below to update for today:[id number]}  Medically Ready for Discharge: Anticipated in 2-4 Days           Maria Del Carmen Mendez MD  Hospitalist Service  [facility]  Securely message with [employer] (more info)  Text page via [facility] Paging/Directory "     ______________________________________________________________________    Chief Complaint   GIB        History of Present Illness   Angel Garcia is a 65 year old male with a medical history significant for a history of Crohn's disease, eosinophilic esophagitis, prior history of peptic ulcer, chronic back pain, major depression on SSRIs, GERD, latent TB and multiple medical comorbidities including a prior history of GI bleed requiring blood transfusion who is admitted with history of black stools as a possible complication of a recent endoscopy with polypectomy.    Patient presented to the ER with a history suggestive of occasional polyp removal output on Friday, 7/11/2025.  Patient reports that he felt fatigue over the past 2 days.  Today he developed tarry stools and reports that he had a small amount of red blood which later developed into dark tarry stools.  Patient presented to the ER for further evaluation.    Preliminary workup done in the ED showed a CBC which was unremarkable with a hemoglobin of 14.4, platelets 199, WBC 8.5.  BMP was unremarkable.  Patient had a type and screen done.  CT abdomen and pelvis done showed results below    Narrative & Impression   EXAM: CTA GI BLEED  LOCATION: [facility]  DATE: 7/17/2025     INDICATION: Diarrhea and black stools. Endoscopic duodenal polyp resection 7/11/2025.  COMPARISON: 3/7/2023  TECHNIQUE: CT angiogram abdomen pelvis during arterial phase of injection of IV contrast. 2D and 3D MIP reconstructions were performed by the CT technologist. Dose reduction techniques were used.  CONTRAST: Isovue 370 90ML     FINDINGS:  ANGIOGRAM ABDOMEN/PELVIS: Normal caliber aorta, no dissection. Splanchnic, renal and iliac arteries are patent.     On the arterial phase images there is a linear hyperdensity within transverse duodenum (series 6 images 155-170; coronal series 8 images 61-63). This linear hyperdensity is not seen on the precontrast nor  on the two-minute delayed series. On the delayed   images there is very faint intraluminal hyperdensity within proximal jejunum (best appreciated series 9 images 160-164 using narrow liver windows). The linear hyperdensity is favored to relate to a slow or transient GI bleed. The site of bleeding is   located immediately downstream from a large endovascular surgical clip present at junction of descending and transverse duodenal segments.     LOWER CHEST: Normal.     HEPATOBILIARY: Fatty infiltration of liver.     PANCREAS: Normal.     SPLEEN: Normal.     ADRENAL GLANDS: Normal.     KIDNEYS/BLADDER: Benign renal cysts need no dedicated follow-up.     BOWEL: No obstruction or inflammatory change.     LYMPH NODES: Normal.     PELVIC ORGANS: Normal.     MUSCULOSKELETAL: Normal.                                                                      IMPRESSION:  1.  Exam is positive for active extravasation involving the transverse portion of duodenum immediately downstream from an endoscopic surgical clip at site of recent duodenal polyp resection.     Findings discussed with Dr. Leyva at 2305 hours       Case was discussed with GI and the recommendation was started PPI and keep n.p.o. for an endoscopy in the morning.    Patient is being admitted for further inpatient cares.    Past Medical History    Medical History  Medical History Date Comments   Crohn's disease (HC)       Panic disorder without agoraphobia       Major depressive disorder, recurrent episode       Eosinophilic esophagitis       Bilateral ocular hypertension       Essential hypertension 03/06/2023     Other chronic pain 03/06/2023     Hand dysfunction 09/26/2022     Right elbow pain 09/26/2022     Abdominal bloating 08/16/2022     Positive QuantiFERON-TB Gold test 08/16/2022     Skin eruption 08/16/2022     Psoriasis 09/03/2021     Abdominal distension, gaseous 04/16/2019     Crohn's disease with complication 09/17/2018     Pain medication agreement  signed 06/17/2016     Overview (09/17/2018):    Formatting of this note might be different from the original.  Overview:  The patient will receive 45 T#3 per month for chronic bilateral knee pain.    Mckinley Rodriguez MD .................... 6/17/2016 4:29 PM   Bilateral ocular hypertension 11/27/2015     Lumbago with sciatica 10/15/2015     Eosinophilic esophagitis 06/11/2015     Major depressive disorder, recurrent episode 10/16/2013     Hallux rigidus 04/19/2012     Gastroesophageal reflux disease 05/16/2011     Impingement syndrome, shoulder 09/25/2009     Generalized anxiety disorder 09/22/2009     Panic disorder without agoraphobia 09/22/2009        Past Surgical History   Surgical History  Surgery Date Site/Laterality Comments   APPENDECTOMY         ESOPHAGOGASTRODUODENOSCOPY         ESOPHAGOGASTRODUODENOSCOPY 07/11/2025   With Duodenal Polyp Removal        Prior to Admission Medications   None        Review of Systems    The 10 point Review of Systems is negative other than noted in the HPI or here.     Social History   I have reviewed this patient's social history and updated it with pertinent information if needed.         Family History   Family History  Medical History Relation Name Comments   Alcohol/Drug Daughter       Alcohol/Drug Father       Heart Disease Father       Other Father   Glaucoma Suspect?   Stroke Father       Heart Disease Mother       Psychiatric illness Mother   Depression   Alcohol/Drug Sister 1       Psychiatric illness Sister 2   2 sisters.   Other Sister 3   1 sister. Liver failure/HepC.     Family History  Relation Name Status Comments   Daughter         Father         Mother         Sister 1         Sister 2         Sister 3               Allergies   Allergies   Allergen Reactions     Sulfa Antibiotics Other (See Comments)     Other Reaction(s): Idiopathic Thrombocytopenia    itp     Ace Inhibitors Headache     Headaches, pressure behind eyes, diarrhea     Trimethoprim  Other (See Comments) and Unknown     Other Reaction(s): Idiopathic Thrombocytopenia    Idiopathic Thrombocytopenia    Idiopathic Thrombocytopenia   Idiopathic Thrombocytopenia     Adhesive Tape Rash     Ceftriaxone Hives and Rash     Oxycodone Rash     pt has been taking tylenol w/ codeine and changed to oxycodone, rash occured        Physical Exam   Vital Signs: Temp: 98.9  F (37.2  C) Temp src: Temporal BP: (!) 154/74 Pulse: 78   Resp: 18 SpO2: 96 % O2 Device: None (Room air)    Weight: 225 lbs 9.6 oz      General Aox3, appropriate affect, NAD, on RA  HEENT  MMM, EOMI, PERRL  Chest Adeq E b/l, No wheezing  Heart RRR, No M/R/G  Abd- Soft, *** NT, BS+  - Deferred,   Extremity- Moving all extremities, No digital clubbing,   No edema  Neuro- Aox3, grossly non focal,  gait not checked  Skin  Has no tattoo, No skin rash     Medical Decision Making   { TIP   MDM Calculator    MDM grid (w/ times)    Coding Support Chat  Billing is now based on time OR medical decision making complexity. Medical decision making included in your A&P does NOT need to be re-documented here.    :[id number]}    85 MINUTES SPENT BY ME on the date of service doing chart review, history, exam, documentation & further activities per the note.      ------------------ MEDICAL DECISION MAKING ------------------------------------------------------------------------------------------------------  MANAGEMENT DISCUSSED with the following over the past 24 hours: patient and care team       Data   ------------------------- PAST 24 HR DATA REVIEWED -----------------------------------------------    I have personally reviewed the following data over the past 24 hrs:    8.5  \   14.4   / 199     144 110 (H) 22.6 /  111 (H)   4.2 23 0.84 \     ALT: 24 AST: 21 AP: 99 TBILI: 0.5   ALB: 4.3 TOT PROTEIN: 7.1 LIPASE: N/A     INR:  0.99 PTT:  N/A   D-dimer:  N/A Fibrinogen:  N/A       Imaging results reviewed over the past 24 hrs:   Recent Results (from the past 24  hours)   CTA GI Bleed    Narrative    EXAM: CTA GI BLEED  LOCATION: [facility]  DATE: 7/17/2025    INDICATION: Diarrhea and black stools. Endoscopic duodenal polyp resection 7/11/2025.  COMPARISON: 3/7/2023  TECHNIQUE: CT angiogram abdomen pelvis during arterial phase of injection of IV contrast. 2D and 3D MIP reconstructions were performed by the CT technologist. Dose reduction techniques were used.  CONTRAST: Isovue 370 90ML    FINDINGS:  ANGIOGRAM ABDOMEN/PELVIS: Normal caliber aorta, no dissection. Splanchnic, renal and iliac arteries are patent.    On the arterial phase images there is a linear hyperdensity within transverse duodenum (series 6 images 155-170; coronal series 8 images 61-63). This linear hyperdensity is not seen on the precontrast nor on the two-minute delayed series. On the delayed   images there is very faint intraluminal hyperdensity within proximal jejunum (best appreciated series 9 images 160-164 using narrow liver windows). The linear hyperdensity is favored to relate to a slow or transient GI bleed. The site of bleeding is   located immediately downstream from a large endovascular surgical clip present at junction of descending and transverse duodenal segments.    LOWER CHEST: Normal.    HEPATOBILIARY: Fatty infiltration of liver.    PANCREAS: Normal.    SPLEEN: Normal.    ADRENAL GLANDS: Normal.    KIDNEYS/BLADDER: Benign renal cysts need no dedicated follow-up.    BOWEL: No obstruction or inflammatory change.    LYMPH NODES: Normal.    PELVIC ORGANS: Normal.    MUSCULOSKELETAL: Normal.      Impression    IMPRESSION:  1.  Exam is positive for active extravasation involving the transverse portion of duodenum immediately downstream from an endoscopic surgical clip at site of recent duodenal polyp resection.    Findings discussed with Dr. Leyva at 2305 hours

## 2025-07-18 NOTE — MEDICATION SCRIBE - ADMISSION MEDICATION HISTORY
Medication Scribe Admission Medication History    Admission medication history is complete. The information provided in this note is only as accurate as the sources available at the time of the update.    Information Source(s): Patient via in-person    Pertinent Information: PTA med list updated per patient.  Patient states that he took the last dose of Stelara on 6/27/2025 and will start taking Yesintek 90 mg /ml q6w in the future.  Patient states that he received Rx fo Levalbuterol and have not started taking yet.    Changes made to PTA medication list:  Added: All meds on PTA med list(per patient and fill history)  Deleted: None  Changed: None    Allergies reviewed with patient and updates made in EHR: yes    Medication History Completed By: Bhakti Woo 7/18/2025 12:47 AM    PTA Med List   Medication Sig Last Dose/Taking    acetaminophen-codeine (TYLENOL #3) 300-30 MG per tablet Take 1 tablet by mouth every 4 hours as needed for pain. Taking As Needed    ALPRAZolam (XANAX) 0.5 MG tablet Take 0.5 mg by mouth 2 times daily as needed for anxiety. Taking As Needed    atenolol (TENORMIN) 25 MG tablet Take 25 mg by mouth 2 times daily. 7/17/2025 Morning    cetirizine (ZYRTEC) 10 MG tablet Take 2 tablets by mouth every evening. 7/16/2025 Evening    cetirizine (ZYRTEC) 10 MG tablet Take 10 mg by mouth every morning. 7/17/2025 Morning    escitalopram (LEXAPRO) 10 MG tablet Take 10 mg by mouth daily. 7/16/2025 Evening    famotidine (PEPCID) 20 MG tablet Take 20 mg by mouth 2 times daily. 7/17/2025 Morning    levalbuterol (XOPENEX HFA) 45 MCG/ACT inhaler Inhale 1-2 puffs into the lungs every 4 hours as needed for wheezing. Unknown    omeprazole (PRILOSEC) 20 MG DR capsule Take 20 mg by mouth 2 times daily. 7/17/2025 Morning    psyllium (METAMUCIL SMOOTH TEXTURE) 58.6 % powder Take 2 Tablespoonful by mouth every 24 hours. 7/17/2025 Morning    STELARA 90 MG/ML injection Inject 90 mg subcutaneously once every six  weeks. 6/27/2025    ustekinumab-kfce (YESINTEK) 90 MG/ML injection Inject 1 mL subcutaneously once every six weeks. Unknown    magnesium (RA NATURAL MAGNESIUM) 250 MG tablet Take 1 tablet by mouth daily. magnesium (RA NATURAL MAGNESIUM) 250 MG tablet 7/17/2025 Morning

## 2025-07-18 NOTE — ED TRIAGE NOTES
Patient had a duodenum polyp removal at [facility] on Friday through [place]. Patient reports that he has felt fatigued over the last two days. Today patient developed tarry stools today. Patient reports that he first had a small amount of red blood, later developed into dark tarry stools. Patient has a history of GI bleed requiring blood transfusion.      Triage Assessment (Adult)       Row Name 07/17/25 2041          Triage Assessment    Airway WDL WDL        Respiratory WDL    Respiratory WDL WDL        Skin Circulation/Temperature WDL    Skin Circulation/Temperature WDL WDL        Cardiac WDL    Cardiac WDL WDL        Peripheral/Neurovascular WDL    Peripheral Neurovascular WDL WDL        Cognitive/Neuro/Behavioral WDL    Cognitive/Neuro/Behavioral WDL WDL

## 2025-07-18 NOTE — H&P
GENERAL PRE-PROCEDURE:   Procedure:  EGD  Date/Time:  7/18/2025 11:31 AM    Verbal consent obtained?: Yes    Written consent obtained?: Yes    Risks and benefits: Risks, benefits and alternatives were discussed    Consent given by:  Patient  Patient states understanding of procedure being performed: Yes    Patient's understanding of procedure matches consent: Yes    Procedure consent matches procedure scheduled: Yes    Expected level of sedation:  Deep  Appropriately NPO:  Yes  ASA Class:  2  Mallampati  :  Grade 2- soft palate, base of uvula, tonsillar pillars, and portion of posterior pharyngeal wall visible  Lungs:  Lungs clear with good breath sounds bilaterally  Heart:  Normal heart sounds and rate  History & Physical reviewed:  History and physical reviewed and no updates needed  Statement of review:  I have reviewed the lab findings, diagnostic data, medications, and the plan for sedation

## 2025-07-18 NOTE — ED PROVIDER NOTES
EMERGENCY DEPARTMENT ENCOUNTER       ED Course & Medical Decision Making     9:06 PM I met with the patient to obtain patient history and performed a physical exam. Discussed plan for ED work up including potential diagnostic studies and interventions.  11:04 PM Spoke with [facility].   11:12 PM Spoke with AKI Cordon, who recommends admission.  11:14 PM Reassessed and updated patient with findings.  11:50 PM Spoke with Dr Mendez (hospitalist) regarding admission    Final Impression  65 year old male presents for evaluation of black stools and some generalized fatigue.  Patient underwent upper endoscopy on 7/11/2025 through [facility] for removal of a 2 cm duodenal polyp.  States that he initially felt pretty good for most of the last week up until the last few days he began feeling a little bit fatigued, then today began having frankly black stools.  States that he has Crohn's, frequently has watery stool/diarrhea at baseline, though throughout the day today it has been persistently black, describing it as looking like squid ink. Has a stool sample in a cup at bedside, it does admittedly look like a cup full of squid ink.  Patient reports a little bit of abdominal fullness, though on exam does not have any tenderness, guarding, or rebound.  Vitals are normal.  Denies being on blood thinners.    Belly labs normal, hemoglobin normal at 14.4, coags normal.   Prior hemoglobin in the [facility] system on 6/25/2025 was 15.2.  Type and screen sent.  Dose of Protonix given.  CTA returned showing small amount of active extravasation just downstream from the endoscopic clip that was placed near his duodenal ulcer.  Spoke with [facility] on-call, they recommended twice daily PPI and n.p.o., plan for repeat endoscopy tomorrow morning.  Patient admitted to hospitalist.    Prior to making a final disposition on this patient the results of patient's tests and other diagnostic studies were discussed with  the patient. All questions were answered. Patient expressed understanding of the plan and was amenable to it.    Medical Decision Making  Supplemental history from: family member  External Record(s) reviewed as documented below;  7/11/25, [facility] note, seen for EGD, single duodenal polyp was resected, retrieved, clip placed.  Chart documentation includes differential considered  EKGs or imaging independently interpreted my me (see Labs & Imaging and EKG sections).  DDx considered but not limited to: upper GI bleed, duodenal ulcer, stomach ulcer  Discussion of management with another provider: Gastroenterology  Care impacted by chronic illness: Crohn's disease, generalized anxiety disorder  Disposition considerations: Admit.    Not Applicable    None    Medications   diazepam (VALIUM) injection 5 mg (has no administration in time range)   sodium chloride 0.9% BOLUS 1,000 mL (0 mLs Intravenous Stopped 7/17/25 2304)   pantoprazole (PROTONIX) injection 40 mg (40 mg Intravenous $Given 7/17/25 2122)   iopamidol (ISOVUE-370) solution 90 mL (90 mLs Intravenous $Given 7/17/25 2213)       New Prescriptions    No medications on file     Modified Medications    No medications on file     Final Impression     1. Upper GI bleed    2. Black stools        Chief Complaint     Chief Complaint   Patient presents with    Melena    Post-op Problem     Patient had a duodenum polyp removal at [facility] on Friday through [facility]. Patient reports that he has felt fatigued over the last two days. Today patient developed tarry stools today. Patient reports that he first had a small amount of red blood, later developed into dark tarry stools. Patient has a history of GI bleed requiring blood transfusion.     HPI     Angel Garcia is a 65 year old male who presents for evaluation of melena. Patient had a duodenum polyp removal at [facility] on 7/11/2025 (6 days ago) through [facility]. He reports fatigue over the past couple  "days. His stools, loose at baseline, were fairly normal and states doing okay post-op until today. Patient endorses black loose stools today. He also endorses \"fullness\" of his abdomen, but denies pain. Patient confirms history of crohn's disease and a GI bleed in 2000. Patient denies use of blood thinners and increased diarrhea.    I, Anurag Jackson am serving as a scribe to document services personally performed by Dr. Angel Leyva MD, based on my observation and the provider's statements to me. I, Dr. Angel Leyva MD attest that Anurag Paz is acting in a scribe capacity, has observed my performance of the services and has documented them in accordance with my direction.    Physical Exam     BP (!) 154/74   Pulse 78   Temp 98.9  F (37.2  C) (Temporal)   Resp 18   Ht 1.74 m (5' 8.5\")   Wt 102.3 kg (225 lb 9.6 oz)   SpO2 96%   BMI 33.80 kg/m    Constitutional: Awake, alert, in no acute distress.  Head: Normocephalic, atraumatic.  ENT: Mucous membranes moist.  Eyes: Conjunctiva normal.  Respiratory: Respirations even, unlabored, in no acute respiratory distress.  Cardiovascular: Regular rate and rhythm. Good peripheral perfusion.  GI: Abdomen soft, minimally distended, though non-tender, no guarding or rebound  Musculoskeletal: Moves all 4 extremities equally.  Integument: Warm, dry.  Neurologic: Alert & oriented x 3. Normal speech. Grossly normal motor and sensory function. No focal deficits noted.  Psychiatric: Normal mood    Labs & Imaging     Imaging reviewed and independently interpreted as below;   CTA showing active patient near the endoscopic clip    Results for orders placed or performed during the hospital encounter of 07/17/25   CTA GI Bleed    Impression    IMPRESSION:  1.  Exam is positive for active extravasation involving the transverse portion of duodenum immediately downstream from an endoscopic surgical clip at site of recent duodenal polyp resection.    Findings discussed with " Dr. Leyva at 2305 hours   INR   Result Value Ref Range    INR 0.99 0.85 - 1.15    PT 13.4 11.8 - 14.8 Seconds   Comprehensive metabolic panel   Result Value Ref Range    Sodium 144 135 - 145 mmol/L    Potassium 4.2 3.4 - 5.3 mmol/L    Carbon Dioxide (CO2) 23 22 - 29 mmol/L    Anion Gap 11 7 - 15 mmol/L    Urea Nitrogen 22.6 8.0 - 23.0 mg/dL    Creatinine 0.84 0.67 - 1.17 mg/dL    GFR Estimate >90 >60 mL/min/1.73m2    Calcium 8.9 8.8 - 10.4 mg/dL    Chloride 110 (H) 98 - 107 mmol/L    Glucose 111 (H) 70 - 99 mg/dL    Alkaline Phosphatase 99 40 - 150 U/L    AST 21 0 - 45 U/L    ALT 24 0 - 70 U/L    Protein Total 7.1 6.4 - 8.3 g/dL    Albumin 4.3 3.5 - 5.2 g/dL    Bilirubin Total 0.5 <=1.2 mg/dL   CBC with platelets and differential   Result Value Ref Range    WBC Count 8.5 4.0 - 11.0 10e3/uL    RBC Count 4.85 4.40 - 5.90 10e6/uL    Hemoglobin 14.4 13.3 - 17.7 g/dL    Hematocrit 43.5 40.0 - 53.0 %    MCV 90 78 - 100 fL    MCH 29.7 26.5 - 33.0 pg    MCHC 33.1 31.5 - 36.5 g/dL    RDW 13.1 10.0 - 15.0 %    Platelet Count 199 150 - 450 10e3/uL    % Neutrophils 51 %    % Lymphocytes 41 %    % Monocytes 6 %    % Eosinophils 2 %    % Basophils 0 %    % Immature Granulocytes 0 %    NRBCs per 100 WBC 0 <1 /100    Absolute Neutrophils 4.3 1.6 - 8.3 10e3/uL    Absolute Lymphocytes 3.5 0.8 - 5.3 10e3/uL    Absolute Monocytes 0.5 0.0 - 1.3 10e3/uL    Absolute Eosinophils 0.2 0.0 - 0.7 10e3/uL    Absolute Basophils 0.0 0.0 - 0.2 10e3/uL    Absolute Immature Granulocytes 0.0 <=0.4 10e3/uL    Absolute NRBCs 0.0 10e3/uL   Extra Red Top Tube   Result Value Ref Range    Hold Specimen [facility]    Adult Type and Screen   Result Value Ref Range    ABO/RH(D) O POS     Antibody Screen Negative Negative    SPECIMEN EXPIRATION DATE 7/20/2025 11:59:00 PM Angel Diaz MD  07/17/25 2639

## 2025-07-18 NOTE — H&P
I have reviewed the surgical /preoperative H&P that is linked to this encounter, and examined the patient. There are no significant changes.    Tyler Mitchell MD

## 2025-07-18 NOTE — PLAN OF CARE
Goal Outcome Evaluation:      Problem: Adult Inpatient Plan of Care  Goal: Optimal Comfort and Wellbeing  Outcome: Progressing         Patient is pleasant, cooperative, alert and oriented x 4.  Denies pain.  Up to bathroom x 2 with standby assist. Urinated x 1 and 1 large loose/watery tarry/dark red stool. Patient reports feeling dizzy and mildly diaphoretic with movement.  Q 4 hgb checks.  Plan for EGD.  Continue with plan of care.   • Provided appropriate VIS sheet    • Informed to stay in the general area for 10 to 15 minutes after getting flu shot in case there is a reaction to the shot right away.    • Informed vaccine information may be shared with the [place] Immunization Registry.    Medical History Response:  1) Do you have a moderate to severe fever?- NO  2) Have you had a serious reaction to a flu shot before?- NO  3) Have you ever had Guillain [place] Syndrome within 6 weeks of a previous flu shot?- NO  4) If you are answering for a child, is the child less than 6 months of age?-NO    If you answered \"YES\" to any of the above, you may not be eligible to receive the vaccine.    Provided immunization as documented in EMR.      Reaction noted?  none

## 2025-07-18 NOTE — H&P
"[facility]    History and Physical - Hospitalist Service       Date of Admission:  7/17/2025    Assessment & Plan      Angel Garcia is a 65 year old male admitted on 7/17/2025. He ***    ***        Diet: NPO for Medical/Clinical Reasons Except for: No Exceptions    DVT Prophylaxis: {DVT PROPHYLAXIS:951311}  Gordon Catheter: Not present  Lines: None     Cardiac Monitoring: None  Code Status:  ***    Clinically Significant Risk Factors Present on Admission   { TIP  This section helps capture the illness of the patient on admission.     - Review diagnoses highlighted in blue; right click, edit & delete if not appropriate   - If blank, no additional diagnoses identified   :09560}       # Hyperchloremia: Highest Cl = 110 mmol/L in last 2 days, will monitor as appropriate                        # Obesity: Estimated body mass index is 33.8 kg/m  as calculated from the following:    Height as of this encounter: 1.74 m (5' 8.5\").    Weight as of this encounter: 102.3 kg (225 lb 9.6 oz).              Disposition Plan   {TIP  It is required to update Medically Ready for Discharge [MRD] daily until the patient is 'Ready Now' (meets clinical goals). MRD reflects medical readiness -- not SANCHEZ (Expected Discharge Date), which may be delayed by disposition. Last MRD-   . Use the SmartList below to update for today:772792}  Medically Ready for Discharge: Anticipated in 2-4 Days           Maria Del Carmen Mendez MD  Hospitalist Service  [facility]  Securely message with [employer] (more info)  Text page via MeeDoc Paging/Directory     ______________________________________________________________________    Chief Complaint   GIB        History of Present Illness   Angel Garcia is a 65 year old male with a medical history significant       Its greatest so to hear I am calling about room the last 1 that is pending at 6 hold on hold on limited may just almost in the chart duodenal shari had " centimeter duodenal polyp removed by me initially about a week ago on the 11th said he was feeling pretty good until today he noticed that he had black stool with acute on chronic diarrhea and history of Crohn's recommend stool is always like basically straight liquid diet urine like black in the black UA looks like sequelae again I agree Medical.  We Reviewed like She Had Black but No History Required Belly Pain Negative I Feel Little Bit Full Get a CTA He Does Have Some Active Extremity Extravasation Just Downstream of Where They Put the Alcohol and They Removed That Follow-Up with Dr. Allan MORROW Lasix Twice Daily PPI N.P.O. with Midnight and We Will Plan on Doing an Endoscopy in the Morning Does Have a History of Anxiety so I Gave Him His Lexapro This Evening and Also Gave Him 5 of Oral Valium of IV Valium This Is a Anxious 20 Years Ago with a Pretty Significant Black Melanotic GI Bleed and Required Blood Transfusions Got Pretty Sick That Was like 20+ Years Ago so This Is Here with Symptoms Probably Why He Is Bleeding so Can You Give Him Something Else for Anxiety Yeah Just the Valium Yeah I Think That Should Be Fine When He Should so This Is Hemoglobin of 7 or 8 and Normal 14.4 He Is Not Dehydrated so I Gave Him a Little Bit of Fluids Heart Rate Is Normal Blood Pressure in the 150s Heart Rate in the 70s.  Check Orders Prior's Hemoglobin Is 14.4 Severe and He Was Seen in Our Last Hemoglobin Was 16.2 a Couple of Weeks Ago so Now Pretty Pretty Similar 0.4 Prior to That Looking so We Do Need to Yeah Yeah Mr. Rosenberg Is Fine Mom's Are No Longer Be Here He Is Actually Seen the Blood I Have Not Seen Blood but It Is Straight Black and We Can See Leaking out on the CT Scans Open Yeah Probably Admit Yeah Yeah Thank You Thank You so You Not Planning to Talk to GI so I Did I Did I Spoke with Dr. Burkett of GI He Is What He Said PPI Twice Daily I Gave Him the First Dose This Evening and That They Will Scope in the Morning Okay I  Think      Past Medical History    Medical History  Medical History Date Comments   Crohn's disease (HC)       Panic disorder without agoraphobia       Major depressive disorder, recurrent episode       Eosinophilic esophagitis       Bilateral ocular hypertension       Essential hypertension 03/06/2023     Other chronic pain 03/06/2023     Hand dysfunction 09/26/2022     Right elbow pain 09/26/2022     Abdominal bloating 08/16/2022     Positive QuantiFERON-TB Gold test 08/16/2022     Skin eruption 08/16/2022     Psoriasis 09/03/2021     Abdominal distension, gaseous 04/16/2019     Crohn's disease with complication 09/17/2018     Pain medication agreement signed 06/17/2016     Overview (09/17/2018):    Formatting of this note might be different from the original.  Overview:  The patient will receive 45 T#3 per month for chronic bilateral knee pain.    Mckinley Rodriguez MD .................... 6/17/2016 4:29 PM   Bilateral ocular hypertension 11/27/2015     Lumbago with sciatica 10/15/2015     Eosinophilic esophagitis 06/11/2015     Major depressive disorder, recurrent episode 10/16/2013     Hallux rigidus 04/19/2012     Gastroesophageal reflux disease 05/16/2011     Impingement syndrome, shoulder 09/25/2009     Generalized anxiety disorder 09/22/2009     Panic disorder without agoraphobia 09/22/2009        Past Surgical History   Surgical History  Surgery Date Site/Laterality Comments   APPENDECTOMY         ESOPHAGOGASTRODUODENOSCOPY         ESOPHAGOGASTRODUODENOSCOPY 07/11/2025   With Duodenal Polyp Removal        Prior to Admission Medications   None        Review of Systems    The 10 point Review of Systems is negative other than noted in the HPI or here.     Social History   I have reviewed this patient's social history and updated it with pertinent information if needed.         Family History   Family History  Medical History Relation Name Comments   Alcohol/Drug Daughter       Alcohol/Drug Father        Heart Disease Father       Other Father   Glaucoma Suspect?   Stroke Father       Heart Disease Mother       Psychiatric illness Mother   Depression   Alcohol/Drug Sister 1       Psychiatric illness Sister 2   2 sisters.   Other Sister 3   1 sister. Liver failure/HepC.     Family History  Relation Name Status Comments   Daughter         Father         Mother         Sister 1         Sister 2         Sister 3               Allergies   Allergies   Allergen Reactions    Sulfa Antibiotics Other (See Comments)     Other Reaction(s): Idiopathic Thrombocytopenia    itp    Ace Inhibitors Headache     Headaches, pressure behind eyes, diarrhea    Trimethoprim Other (See Comments) and Unknown     Other Reaction(s): Idiopathic Thrombocytopenia    Idiopathic Thrombocytopenia    Idiopathic Thrombocytopenia   Idiopathic Thrombocytopenia    Adhesive Tape Rash    Ceftriaxone Hives and Rash    Oxycodone Rash     pt has been taking tylenol w/ codeine and changed to oxycodone, rash occured        Physical Exam   Vital Signs: Temp: 98.9  F (37.2  C) Temp src: Temporal BP: (!) 154/74 Pulse: 78   Resp: 18 SpO2: 96 % O2 Device: None (Room air)    Weight: 225 lbs 9.6 oz      General Aox3, appropriate affect, NAD, on RA  HEENT  MMM, EOMI, PERRL  Chest Adeq E b/l, No wheezing  Heart RRR, No M/R/G  Abd- Soft, *** NT, BS+  - Deferred,   Extremity- Moving all extremities, No digital clubbing,   No edema  Neuro- Aox3, grossly non focal,  gait not checked  Skin  Has no tattoo, No skin rash     Medical Decision Making   { TIP   MDM Calculator    MDM grid (w/ times)    Coding Support Chat  Billing is now based on time OR medical decision making complexity. Medical decision making included in your A&P does NOT need to be re-documented here.    :[id number]}    85 MINUTES SPENT BY ME on the date of service doing chart review, history, exam, documentation & further activities per the note.      ------------------ MEDICAL DECISION MAKING  ------------------------------------------------------------------------------------------------------  MANAGEMENT DISCUSSED with the following over the past 24 hours: patient and care team       Data   ------------------------- PAST 24 HR DATA REVIEWED -----------------------------------------------    I have personally reviewed the following data over the past 24 hrs:    8.5  \   14.4   / 199     144 110 (H) 22.6 /  111 (H)   4.2 23 0.84 \     ALT: 24 AST: 21 AP: 99 TBILI: 0.5   ALB: 4.3 TOT PROTEIN: 7.1 LIPASE: N/A     INR:  0.99 PTT:  N/A   D-dimer:  N/A Fibrinogen:  N/A       Imaging results reviewed over the past 24 hrs:   Recent Results (from the past 24 hours)   CTA GI Bleed    Narrative    EXAM: CTA GI BLEED  LOCATION: [facility]  DATE: 7/17/2025    INDICATION: Diarrhea and black stools. Endoscopic duodenal polyp resection 7/11/2025.  COMPARISON: 3/7/2023  TECHNIQUE: CT angiogram abdomen pelvis during arterial phase of injection of IV contrast. 2D and 3D MIP reconstructions were performed by the CT technologist. Dose reduction techniques were used.  CONTRAST: Isovue 370 90ML    FINDINGS:  ANGIOGRAM ABDOMEN/PELVIS: Normal caliber aorta, no dissection. Splanchnic, renal and iliac arteries are patent.    On the arterial phase images there is a linear hyperdensity within transverse duodenum (series 6 images 155-170; coronal series 8 images 61-63). This linear hyperdensity is not seen on the precontrast nor on the two-minute delayed series. On the delayed   images there is very faint intraluminal hyperdensity within proximal jejunum (best appreciated series 9 images 160-164 using narrow liver windows). The linear hyperdensity is favored to relate to a slow or transient GI bleed. The site of bleeding is   located immediately downstream from a large endovascular surgical clip present at junction of descending and transverse duodenal segments.    LOWER CHEST: Normal.    HEPATOBILIARY: Fatty  infiltration of liver.    PANCREAS: Normal.    SPLEEN: Normal.    ADRENAL GLANDS: Normal.    KIDNEYS/BLADDER: Benign renal cysts need no dedicated follow-up.    BOWEL: No obstruction or inflammatory change.    LYMPH NODES: Normal.    PELVIC ORGANS: Normal.    MUSCULOSKELETAL: Normal.      Impression    IMPRESSION:  1.  Exam is positive for active extravasation involving the transverse portion of duodenum immediately downstream from an endoscopic surgical clip at site of recent duodenal polyp resection.    Findings discussed with Dr. Leyva at 2305 hours

## 2025-07-19 VITALS
SYSTOLIC BLOOD PRESSURE: 134 MMHG | RESPIRATION RATE: 17 BRPM | BODY MASS INDEX: 31.8 KG/M2 | HEART RATE: 86 BPM | WEIGHT: 214.73 LBS | OXYGEN SATURATION: 99 % | TEMPERATURE: 98.5 F | HEIGHT: 69 IN | DIASTOLIC BLOOD PRESSURE: 71 MMHG

## 2025-07-19 LAB
ANION GAP SERPL CALCULATED.3IONS-SCNC: 9 MMOL/L (ref 7–15)
BUN SERPL-MCNC: 16.8 MG/DL (ref 8–23)
CALCIUM SERPL-MCNC: 8.7 MG/DL (ref 8.8–10.4)
CHLORIDE SERPL-SCNC: 110 MMOL/L (ref 98–107)
CREAT SERPL-MCNC: 0.92 MG/DL (ref 0.67–1.17)
EGFRCR SERPLBLD CKD-EPI 2021: >90 ML/MIN/1.73M2
ERYTHROCYTE [DISTWIDTH] IN BLOOD BY AUTOMATED COUNT: 13.3 % (ref 10–15)
GLUCOSE SERPL-MCNC: 97 MG/DL (ref 70–99)
HCO3 SERPL-SCNC: 26 MMOL/L (ref 22–29)
HCT VFR BLD AUTO: 35.2 % (ref 40–53)
HGB BLD-MCNC: 11.6 G/DL (ref 13.3–17.7)
HGB BLD-MCNC: 12.1 G/DL (ref 13.3–17.7)
HOLD SPECIMEN: NORMAL
MCH RBC QN AUTO: 30.1 PG (ref 26.5–33)
MCHC RBC AUTO-ENTMCNC: 33 G/DL (ref 31.5–36.5)
MCV RBC AUTO: 91 FL (ref 78–100)
MCV RBC AUTO: 91 FL (ref 78–100)
PLATELET # BLD AUTO: 184 10E3/UL (ref 150–450)
POTASSIUM SERPL-SCNC: 4 MMOL/L (ref 3.4–5.3)
RBC # BLD AUTO: 3.86 10E6/UL (ref 4.4–5.9)
SODIUM SERPL-SCNC: 145 MMOL/L (ref 135–145)
WBC # BLD AUTO: 10.9 10E3/UL (ref 4–11)

## 2025-07-19 PROCEDURE — 85018 HEMOGLOBIN: CPT | Performed by: HOSPITALIST

## 2025-07-19 PROCEDURE — 250N000013 HC RX MED GY IP 250 OP 250 PS 637

## 2025-07-19 PROCEDURE — 250N000013 HC RX MED GY IP 250 OP 250 PS 637: Performed by: HOSPITALIST

## 2025-07-19 PROCEDURE — 82310 ASSAY OF CALCIUM: CPT | Performed by: HOSPITALIST

## 2025-07-19 PROCEDURE — 250N000013 HC RX MED GY IP 250 OP 250 PS 637: Performed by: INTERNAL MEDICINE

## 2025-07-19 PROCEDURE — 250N000011 HC RX IP 250 OP 636: Performed by: INTERNAL MEDICINE

## 2025-07-19 PROCEDURE — 99239 HOSP IP/OBS DSCHRG MGMT >30: CPT | Mod: FS | Performed by: INTERNAL MEDICINE

## 2025-07-19 PROCEDURE — 36415 COLL VENOUS BLD VENIPUNCTURE: CPT

## 2025-07-19 PROCEDURE — 85018 HEMOGLOBIN: CPT

## 2025-07-19 PROCEDURE — 36415 COLL VENOUS BLD VENIPUNCTURE: CPT | Performed by: HOSPITALIST

## 2025-07-19 PROCEDURE — 99207 PR APP CREDIT; MD BILLING SHARED VISIT: CPT

## 2025-07-19 RX ORDER — LEVALBUTEROL TARTRATE 45 UG/1
1-2 AEROSOL, METERED ORAL EVERY 4 HOURS PRN
Status: DISCONTINUED | OUTPATIENT
Start: 2025-07-19 | End: 2025-07-19 | Stop reason: HOSPADM

## 2025-07-19 RX ORDER — NALOXONE HYDROCHLORIDE 0.4 MG/ML
0.2 INJECTION, SOLUTION INTRAMUSCULAR; INTRAVENOUS; SUBCUTANEOUS
Status: DISCONTINUED | OUTPATIENT
Start: 2025-07-19 | End: 2025-07-19 | Stop reason: HOSPADM

## 2025-07-19 RX ORDER — ESCITALOPRAM OXALATE 10 MG/1
10 TABLET ORAL DAILY
Status: DISCONTINUED | OUTPATIENT
Start: 2025-07-19 | End: 2025-07-19 | Stop reason: HOSPADM

## 2025-07-19 RX ORDER — ATENOLOL 25 MG/1
25 TABLET ORAL 2 TIMES DAILY
Status: DISCONTINUED | OUTPATIENT
Start: 2025-07-19 | End: 2025-07-19 | Stop reason: HOSPADM

## 2025-07-19 RX ORDER — NALOXONE HYDROCHLORIDE 0.4 MG/ML
0.4 INJECTION, SOLUTION INTRAMUSCULAR; INTRAVENOUS; SUBCUTANEOUS
Status: DISCONTINUED | OUTPATIENT
Start: 2025-07-19 | End: 2025-07-19 | Stop reason: HOSPADM

## 2025-07-19 RX ORDER — ACETAMINOPHEN AND CODEINE PHOSPHATE 300; 30 MG/1; MG/1
1 TABLET ORAL EVERY 4 HOURS PRN
Status: DISCONTINUED | OUTPATIENT
Start: 2025-07-19 | End: 2025-07-19 | Stop reason: HOSPADM

## 2025-07-19 RX ORDER — HYDROXYZINE HYDROCHLORIDE 25 MG/1
25 TABLET, FILM COATED ORAL ONCE
Status: COMPLETED | OUTPATIENT
Start: 2025-07-19 | End: 2025-07-19

## 2025-07-19 RX ORDER — ALPRAZOLAM 0.25 MG
0.5 TABLET ORAL 2 TIMES DAILY PRN
Status: DISCONTINUED | OUTPATIENT
Start: 2025-07-19 | End: 2025-07-19 | Stop reason: HOSPADM

## 2025-07-19 RX ORDER — CETIRIZINE HYDROCHLORIDE 10 MG/1
10 TABLET ORAL EVERY MORNING
Status: DISCONTINUED | OUTPATIENT
Start: 2025-07-19 | End: 2025-07-19 | Stop reason: HOSPADM

## 2025-07-19 RX ADMIN — ATENOLOL 25 MG: 25 TABLET ORAL at 08:25

## 2025-07-19 RX ADMIN — ESCITALOPRAM OXALATE 10 MG: 10 TABLET ORAL at 08:25

## 2025-07-19 RX ADMIN — CETIRIZINE HYDROCHLORIDE 10 MG: 10 TABLET, FILM COATED ORAL at 08:25

## 2025-07-19 RX ADMIN — PANTOPRAZOLE SODIUM 40 MG: 40 INJECTION, POWDER, FOR SOLUTION INTRAVENOUS at 08:25

## 2025-07-19 RX ADMIN — FAMOTIDINE 10 MG: 10 TABLET ORAL at 01:41

## 2025-07-19 RX ADMIN — HYDROXYZINE HYDROCHLORIDE 25 MG: 25 TABLET, FILM COATED ORAL at 01:41

## 2025-07-19 ASSESSMENT — ACTIVITIES OF DAILY LIVING (ADL)
ADLS_ACUITY_SCORE: 36
ADLS_ACUITY_SCORE: 37
ADLS_ACUITY_SCORE: 36
ADLS_ACUITY_SCORE: 36
ADLS_ACUITY_SCORE: 37
ADLS_ACUITY_SCORE: 36
ADLS_ACUITY_SCORE: 37
ADLS_ACUITY_SCORE: 37
ADLS_ACUITY_SCORE: 36

## 2025-07-19 NOTE — DISCHARGE SUMMARY
"[facility]  Hospitalist Discharge Summary      Date of Admission:  7/17/2025  Date of Discharge:  7/19/2025  Discharging Provider: Deanna Julio PA-C  Discharge Service: Hospitalist Service    Discharge Diagnoses   Melena   Recent Duodenal Tubulovillous Adenoma Resection  Chron's   EoE  Dermatographia   FLORY  Chronic Pain Syndrome   Essential Hypertension     Clinically Significant Risk Factors     # Obesity: Estimated body mass index is 32.17 kg/m  as calculated from the following:    Height as of this encounter: 1.74 m (5' 8.5\").    Weight as of this encounter: 97.4 kg (214 lb 11.7 oz).       Follow-ups Needed After Discharge   Follow-up Appointments       Follow Up      Follow up with [facility] as directed        Hospital Follow-up with Existing Primary Care Provider (PCP)          Schedule Primary Care visit within: 7 Days   Recommended labs and Imaging (to be ordered by Primary Care Provider): CBC to recheck Hgb             Discharge Disposition   Discharged to home  Condition at discharge: Stable    Hospital Course   Angel Garcia is a 65 year old male with Crohn's disease, eosinophilic esophagitis, FLORY, HTN, chronic back pain, who presented to the ER for evaluation of melena. Patient underwent upper endoscopy on 7/11/2025 through [facility] for removal of a 2 cm duodenal polyp. He had been doing well since procedure but had 1 day of dark, tarry stools and so came to the ER for evaluation. GI consulted, patient underwent repeat EGD 7/18 which showed non-bleeding duodenal ulcer that was injected and clipped. Had small Hgb drop but this remained stable and was able to tolerate regular diet. GI signed off for discharge and follow-up with PCP in 1 week for Hgb recheck to ensure stability.     Melena  Recent Duodenal Tubulovillous Adenoma Resection  Patient presented with fatigue and melena x 1 day. Patient underwent upper endoscopy on 7/11/2025 through [facility] for removal of a 2 cm duodenal polyp   -- Hgb 14.4 --> 12.9, 11.6, 12.1  -- CTA showed active extravasation from site of duodenal polyp resection  -- S/p repeat EGD 7/18 which showed Non-bleeding duodenal ulcer with pigmented material. Injected. Clips were placed   -- GI consultation ; signed off for discharge   -- Continue PTA PPI BID   -- Follow-up with PCP for Hgb recheck in 1 week      Crohn's-- resume home Stelara, Yesintek outpatient   Dermatographia-- continue home Zyrtec. He takes 3x the recommended max dose per his dermatologist.   FLORY, home Lexapro, Xanax  Essential HTN, home atenolol  Reactive airway, home Xopenex PRN  EoE, denies active issue. Recent EGD did not comment on any EoE changes.  Chronic pain syndrome, uses tylenol 3 #45 tabs per month. Denies pain currently.    Consultations This Hospital Stay   GASTROENTEROLOGY IP CONSULT    Code Status   Full Code    Time Spent on this Encounter   I, Deanna Julio PA-C, personally saw the patient today and spent greater than 30 minutes discharging this patient.     This case was discussed with the Attending Physician, Dr. Beau Mcknight, who independently met with and assessed the patient and who is in agreement with the disposition and discharge.    Deanna Julio PA-C  [facility] EXTENDED RECOVERY AND SHORT STAY  [address] [place] [id number]  Phone: [phone]  Fax: [phone]  ______________________________________________________________________    Physical Exam   Vital Signs: Temp: 98.5  F (36.9  C) Temp src: Oral BP: 134/71 Pulse: 86   Resp: 17 SpO2: 99 % O2 Device: None (Room air)    Weight: 214 lbs 11.65 oz    Constitutional: awake, alert, no apparent distress, and appears stated age  Respiratory: No increased work of breathing, no accessory muscle use, clear to auscultation bilaterally, no crackles or wheezing  Cardiovascular: Regular rate and rhythm, normal S1 and S2  GI: Soft, non-distended,  non-tender, normal bowel sounds, no masses palpated, no hepatosplenomegaly  Skin: Normal skin color, texture, turgor. No rashes and no jaundice  Musculoskeletal: no lower extremity pitting edema present.   Neurologic: Awake, alert.   Neuropsychiatric: Appropriate mood, affect and eye contact. Cooperative.       Primary Care Physician   Mckinley Rodriguez    Discharge Orders      Reason for your hospital stay    Melena  Recent Duodenal Tubulovillous Adenoma Resection     Activity    Your activity upon discharge: activity as tolerated     Follow Up    Follow up with [facility] as directed     Diet    Follow this diet upon discharge: Regular Diet Adult     Hospital Follow-up with Existing Primary Care Provider (PCP)            Significant Results and Procedures   Most Recent 3 CBC's:  Recent Labs   Lab Test 07/19/25  1208 07/19/25  0613 07/18/25  1434 07/18/25  1027 07/18/25  0733 07/18/25  0142 07/17/25  2058   WBC  --  10.9  --   --  8.5  --  8.5   HGB 12.1* 11.6* 12.4*   < > 12.9*   < > 14.4   MCV 91 91 92   < > 91   < > 90   PLT  --  184  --   --  183  --  199    < > = values in this interval not displayed.     Most Recent 3 BMP's:  Recent Labs   Lab Test 07/19/25  0613 07/18/25  1344 07/18/25  1035 07/18/25  0733 07/17/25  2058     --   --  144 144   POTASSIUM 4.0  --   --  4.8 4.2   CHLORIDE 110*  --   --  112* 110*   CO2 26  --   --  25 23   BUN 16.8  --   --  31.5* 22.6   CR 0.92  --   --  0.81 0.84   ANIONGAP 9  --   --  7 11   CRAIG 8.7*  --   --  8.5* 8.9   GLC 97 115* 115* 87 111*     Most Recent 3 Hemoglobins:  Recent Labs   Lab Test 07/19/25  1208 07/19/25  0613 07/18/25  1434   HGB 12.1* 11.6* 12.4*   ,   Results for orders placed or performed during the hospital encounter of 07/17/25   CTA GI Bleed    Narrative    EXAM: CTA GI BLEED  LOCATION: [facility]  DATE: 7/17/2025    INDICATION: Diarrhea and black stools. Endoscopic duodenal polyp resection 7/11/2025.  COMPARISON:  3/7/2023  TECHNIQUE: CT angiogram abdomen pelvis during arterial phase of injection of IV contrast. 2D and 3D MIP reconstructions were performed by the CT technologist. Dose reduction techniques were used.  CONTRAST: Isovue 370 90ML    FINDINGS:  ANGIOGRAM ABDOMEN/PELVIS: Normal caliber aorta, no dissection. Splanchnic, renal and iliac arteries are patent.    On the arterial phase images there is a linear hyperdensity within transverse duodenum (series 6 images 155-170; coronal series 8 images 61-63). This linear hyperdensity is not seen on the precontrast nor on the two-minute delayed series. On the delayed   images there is very faint intraluminal hyperdensity within proximal jejunum (best appreciated series 9 images 160-164 using narrow liver windows). The linear hyperdensity is favored to relate to a slow or transient GI bleed. The site of bleeding is   located immediately downstream from a large endovascular surgical clip present at junction of descending and transverse duodenal segments.    LOWER CHEST: Normal.    HEPATOBILIARY: Fatty infiltration of liver.    PANCREAS: Normal.    SPLEEN: Normal.    ADRENAL GLANDS: Normal.    KIDNEYS/BLADDER: Benign renal cysts need no dedicated follow-up.    BOWEL: No obstruction or inflammatory change.    LYMPH NODES: Normal.    PELVIC ORGANS: Normal.    MUSCULOSKELETAL: Normal.      Impression    IMPRESSION:  1.  Exam is positive for active extravasation involving the transverse portion of duodenum immediately downstream from an endoscopic surgical clip at site of recent duodenal polyp resection.    Findings discussed with Dr. Leyva at 2305 hours       Discharge Medications      Review of your medicines        CONTINUE these medicines which have NOT CHANGED        Dose / Directions   acetaminophen-codeine 300-30 MG per tablet  Commonly known as: TYLENOL #3      Dose: 1 tablet  Take 1 tablet by mouth every 4 hours as needed for pain.  Refills: 0     ALPRAZolam 0.5 MG  tablet  Commonly known as: XANAX      Dose: 0.5 mg  Take 0.5 mg by mouth 2 times daily as needed for anxiety.  Refills: 0     atenolol 25 MG tablet  Commonly known as: TENORMIN      Dose: 25 mg  Take 25 mg by mouth 2 times daily.  Refills: 0     * cetirizine 10 MG tablet  Commonly known as: zyrTEC      Dose: 10 mg  Take 10 mg by mouth every morning.  Refills: 0     * cetirizine 10 MG tablet  Commonly known as: zyrTEC      Dose: 2 tablet  Take 2 tablets by mouth every evening.  Refills: 0     escitalopram 10 MG tablet  Commonly known as: LEXAPRO      Dose: 10 mg  Take 10 mg by mouth daily.  Refills: 0     famotidine 20 MG tablet  Commonly known as: PEPCID      Dose: 20 mg  Take 20 mg by mouth 2 times daily.  Refills: 0     levalbuterol 45 MCG/ACT inhaler  Commonly known as: XOPENEX HFA      Dose: 1-2 puff  Inhale 1-2 puffs into the lungs every 4 hours as needed for wheezing.  Refills: 0     Metamucil Smooth Texture 58.6 % powder  Generic drug: psyllium      Dose: 2 Tablespoonful  Take 2 Tablespoonful by mouth every 24 hours.  Refills: 0     omeprazole 20 MG DR capsule  Commonly known as: PriLOSEC      Dose: 20 mg  Take 20 mg by mouth 2 times daily.  Refills: 0     RA Natural Magnesium 250 MG tablet  Generic drug: magnesium      Dose: 1 tablet  Take 1 tablet by mouth daily. magnesium (RA NATURAL MAGNESIUM) 250 MG tablet  Refills: 0     Stelara 90 MG/ML injection  Generic drug: ustekinumab      Dose: 90 mg  Inject 90 mg subcutaneously once every six weeks.  Refills: 0     Yesintek 90 MG/ML injection  Generic drug: ustekinumab-kfce      Dose: 1 mL  Inject 1 mL subcutaneously once every six weeks.  Refills: 0           * This list has 2 medication(s) that are the same as other medications prescribed for you. Read the directions carefully, and ask your doctor or other care provider to review them with you.                Allergies   Allergies   Allergen Reactions    Sulfa Antibiotics Other (See Comments)     Other  Reaction(s): Idiopathic Thrombocytopenia    itp    Ace Inhibitors Headache     Headaches, pressure behind eyes, diarrhea    Trimethoprim Other (See Comments) and Unknown     Other Reaction(s): Idiopathic Thrombocytopenia    Idiopathic Thrombocytopenia    Idiopathic Thrombocytopenia   Idiopathic Thrombocytopenia    Adhesive Tape Rash    Ceftriaxone Hives and Rash    Oxycodone Rash     pt has been taking tylenol w/ codeine and changed to oxycodone, rash occured

## 2025-07-19 NOTE — PLAN OF CARE
Patient admitted to room 6 at approximately 1615 via cart from surgery.  Reason for Admission:   Report received from:   Patient was accompanied by Spouse and Sister.  Discharge transportation provided by:  Patient ambulated/transferred:  hover. air august.  Patient is alert and orientated x 3.  Outpatient Observation education provided to: (patient, family, friend)  MDRO Education done if applicable (MRSA, VRE, etc)  Safety risks were identified during admission:  none.   Yellow risk/fall band applied:  No  Home meds sent home: No - in med room.   Home meds sent to pharmacy:No, in med room     Detailed Belongings: shoes, clothes, cell phone, pill bottle ( in med cabinet)

## 2025-07-19 NOTE — PLAN OF CARE
"  Problem: Adult Inpatient Plan of Care  Goal: Plan of Care Review  Description: The Plan of Care Review/Shift note should be completed every shift.  The Outcome Evaluation is a brief statement about your assessment that the patient is improving, declining, or no change.  This information will be displayed automatically on your shift  note.  Outcome: Progressing  Goal: Patient-Specific Goal (Individualized)  Description: You can add care plan individualizations to a care plan. Examples of Individualization might be:  \"Parent requests to be called daily at 9am for status\", \"I have a hard time hearing out of my right ear\", or \"Do not touch me to wake me up as it startles  me\".  Outcome: Progressing  Goal: Absence of Hospital-Acquired Illness or Injury  Outcome: Progressing  Intervention: Identify and Manage Fall Risk  Recent Flowsheet Documentation  Taken 7/19/2025 0000 by Kathleen Oliveira RN  Safety Promotion/Fall Prevention:   safety round/check completed   nonskid shoes/slippers when out of bed   patient and family education   clutter free environment maintained   activity supervised  Intervention: Prevent Skin Injury  Recent Flowsheet Documentation  Taken 7/19/2025 0000 by Kathleen Oliveira RN  Body Position: position changed independently  Intervention: Prevent and Manage VTE (Venous Thromboembolism) Risk  Recent Flowsheet Documentation  Taken 7/19/2025 0000 by Kathleen Oliveira RN  VTE Prevention/Management: SCDs on (sequential compression devices)  Intervention: Prevent Infection  Recent Flowsheet Documentation  Taken 7/19/2025 0000 by Kathleen Oliveira RN  Infection Prevention:   hand hygiene promoted   single patient room provided   rest/sleep promoted  Goal: Optimal Comfort and Wellbeing  Outcome: Progressing  Goal: Readiness for Transition of Care  Outcome: Progressing   Goal Outcome Evaluation:  Prn hydroxyzine given for itching, and pepcid given for probable heartburn with relief. Rested well afterwards. No " stools/evidence of bleeding in this shift.

## 2025-07-19 NOTE — PROVIDER NOTIFICATION
Dr. Recio notified @ 8520:      Pt requesting Tylenol for head ache, would you please be able to put orders in. Thanks      Orders placed.

## 2025-07-19 NOTE — PLAN OF CARE
Problem: Adult Inpatient Plan of Care  Goal: Optimal Comfort and Wellbeing  Outcome: Progressing  Intervention: Monitor Pain and Promote Comfort  Recent Flowsheet Documentation  Taken 7/18/2025 1645 by Minal Frank, RN  Pain Management Interventions:   emotional support   declines   repositioned     Problem: Skin Injury Risk Increased  Goal: Skin Health and Integrity  Outcome: Progressing  Intervention: Optimize Skin Protection  Recent Flowsheet Documentation  Taken 7/18/2025 1700 by Minal Frank, RN  Activity Management:   ambulated to bathroom   back to bed  Taken 7/18/2025 1645 by Minal Frank, RN  Activity Management:   activity adjusted per tolerance   activity encouraged  Head of Bed (HOB) Positioning: HOB at 30 degrees         Goal Outcome Evaluation:    A&Ox4, VSS on RA. Denies SOB, nausea. Slight epigastric discomfort & headache post procedure - PRN tylenol x1. Tolerating full liquid diet. R PIV SL. Black loose/watery stool x1. Urinal at bedside. Ambulating SBA w GB.     GI consulted.

## 2025-07-19 NOTE — PLAN OF CARE
Goal Outcome Evaluation:      Plan of Care Reviewed With: patient    Overall Patient Progress: improvingOverall Patient Progress: improving     Pt is A&Ox4, denies N/V/D, SOB and dizziness. Last dark stool was 7-18-25, denies N/T in extremities. Wife bedside, no stools on this shift. Pt IV removed and pt dressed. All belongings returned and transportation confirmed. Discharge education complete and all questions answered.

## 2025-07-19 NOTE — PROGRESS NOTES
[facility] progress Note    Subjective: Patient feeling better, mild abdominal discomfort crampy in nature.    Objective:  Vital signs in last 24 hours  Temp:  [97.9  F (36.6  C)-98.8  F (37.1  C)] 98.5  F (36.9  C)  Pulse:  [77-94] 86  Resp:  [14-20] 17  BP: (119-171)/(57-97) 134/71  SpO2:  [96 %-99 %] 99 %     Gen: Awake, no acute distress  Gastrointestinal: Soft, non-tender,     Patient Active Problem List   Diagnosis    Pain in thoracic spine    Lumbago with sciatica    GI bleed    Black stools    Anemia due to blood loss, acute    Benign polyp of duodenum    History of recent blood transfusion    Crohn's disease (H)    Abdominal bloating    Abdominal distension, gaseous    Bilateral ocular hypertension    Crohn's disease of small and large intestines (H)    Eosinophilic esophagitis    Essential hypertension    Gastroesophageal reflux disease    Generalized anxiety disorder    Impingement syndrome, shoulder    Major depressive disorder, recurrent episode    Panic disorder without agoraphobia    Positive QuantiFERON-TB Gold test    Psoriasis    Upper GI bleed       Labs:  CMP Results:   Recent Labs   Lab Test 07/19/25  0613 07/18/25  1035 07/18/25  0733     --  144   POTASSIUM 4.0  --  4.8   CHLORIDE 110*  --  112*   CO2 26  --  25   ANIONGAP 9  --  7   GLC 97   < > 87   BUN 16.8  --  31.5*   CR 0.92  --  0.81   BILITOTAL  --   --  0.5   ALKPHOS  --   --  72   ALT  --   --  21   AST  --   --  17    < > = values in this interval not displayed.      CBC  Recent Labs   Lab 07/19/25  1208 07/19/25  0613 07/18/25  1434 07/18/25  1027 07/18/25  0733 07/18/25  0142 07/17/25  2058   WBC  --  10.9  --   --  8.5  --  8.5   RBC  --  3.86*  --   --  4.12*  --  4.85   HGB 12.1* 11.6* 12.4* 13.3 12.9*   < > 14.4   HCT  --  35.2*  --   --  37.4*  --  43.5   MCV 91 91 92 92 91   < > 90   MCH  --  30.1  --   --  31.3  --  29.7   MCHC  --  33.0  --   --  34.5  --  33.1   RDW  --  13.3  --   --  13.2  --  13.1  "  PLT  --  184  --   --  183  --  199    < > = values in this interval not displayed.     INR  Recent Labs   Lab 07/17/25 2058   INR 0.99      No results found for: \"LIPASE\"  Recent Labs   Lab 07/18/25  0733 07/17/25 2058   AST 17 21   ALT 21 24   ALKPHOS 72 99   BILITOTAL 0.5 0.5        Assessment: Melena, post polypectomy bleeding.  This was treated endoscopically yesterday.    Plan: PPI daily.  Avoid NSAIDs.  Can be discharged from GI standpoint.  Discussed with the hospitalist team.  Total time spent was 20 minutes.                                             Tyler Mitchell MD  Thank you for the opportunity to participate in the care of this patient.   Please feel free to call me with any questions or concerns.  Phone number [phone].             "

## (undated) DEVICE — SOLUTION WATER 1000ML BOTTLE R5000-01

## (undated) DEVICE — PROBE BIPOLAR HEMOSTASIS GOLD 07FRX210CM M00560150

## (undated) DEVICE — SUCTION MANIFOLD NEPTUNE 2 SYS 1 PORT 702-025-000

## (undated) DEVICE — TUBING SUCTION MEDI-VAC 1/4"X20' N620A